# Patient Record
Sex: FEMALE | Race: WHITE | NOT HISPANIC OR LATINO | Employment: OTHER | URBAN - NONMETROPOLITAN AREA
[De-identification: names, ages, dates, MRNs, and addresses within clinical notes are randomized per-mention and may not be internally consistent; named-entity substitution may affect disease eponyms.]

---

## 2017-11-29 ENCOUNTER — AMBULATORY - GICH (OUTPATIENT)
Dept: LAB | Facility: OTHER | Age: 62
End: 2017-11-29

## 2017-11-29 ENCOUNTER — COMMUNICATION - GICH (OUTPATIENT)
Dept: FAMILY MEDICINE | Facility: OTHER | Age: 62
End: 2017-11-29

## 2017-11-29 DIAGNOSIS — Z20.818 CONTACT WITH AND (SUSPECTED) EXPOSURE TO OTHER BACTERIAL COMMUNICABLE DISEASES: ICD-10-CM

## 2017-12-01 LAB — CULTURE - HISTORICAL: NORMAL

## 2017-12-04 ENCOUNTER — COMMUNICATION - GICH (OUTPATIENT)
Dept: FAMILY MEDICINE | Facility: OTHER | Age: 62
End: 2017-12-04

## 2017-12-13 ENCOUNTER — HOSPITAL ENCOUNTER (OUTPATIENT)
Dept: RADIOLOGY | Facility: OTHER | Age: 62
End: 2017-12-13
Attending: FAMILY MEDICINE

## 2017-12-13 ENCOUNTER — AMBULATORY - GICH (OUTPATIENT)
Dept: LAB | Facility: OTHER | Age: 62
End: 2017-12-13

## 2017-12-13 ENCOUNTER — COMMUNICATION - GICH (OUTPATIENT)
Dept: FAMILY MEDICINE | Facility: OTHER | Age: 62
End: 2017-12-13

## 2017-12-13 ENCOUNTER — OFFICE VISIT - GICH (OUTPATIENT)
Dept: FAMILY MEDICINE | Facility: OTHER | Age: 62
End: 2017-12-13

## 2017-12-13 ENCOUNTER — HISTORY (OUTPATIENT)
Dept: FAMILY MEDICINE | Facility: OTHER | Age: 62
End: 2017-12-13

## 2017-12-13 DIAGNOSIS — R79.89 OTHER SPECIFIED ABNORMAL FINDINGS OF BLOOD CHEMISTRY: ICD-10-CM

## 2017-12-13 DIAGNOSIS — E78.5 HYPERLIPIDEMIA: ICD-10-CM

## 2017-12-13 DIAGNOSIS — Z00.00 ENCOUNTER FOR GENERAL ADULT MEDICAL EXAMINATION WITHOUT ABNORMAL FINDINGS: ICD-10-CM

## 2017-12-13 DIAGNOSIS — Z12.31 ENCOUNTER FOR SCREENING MAMMOGRAM FOR MALIGNANT NEOPLASM OF BREAST: ICD-10-CM

## 2017-12-13 DIAGNOSIS — E66.3 OVERWEIGHT: ICD-10-CM

## 2017-12-13 LAB
A/G RATIO - HISTORICAL: 1.7 (ref 1–2)
ALBUMIN SERPL-MCNC: 4.5 G/DL (ref 3.5–5.7)
ALP SERPL-CCNC: 124 IU/L (ref 34–104)
ALT (SGPT) - HISTORICAL: 64 IU/L (ref 7–52)
ANION GAP - HISTORICAL: 10 (ref 5–18)
AST SERPL-CCNC: 36 IU/L (ref 13–39)
BILIRUB SERPL-MCNC: 0.7 MG/DL (ref 0.3–1)
BUN SERPL-MCNC: 18 MG/DL (ref 7–25)
BUN/CREAT RATIO - HISTORICAL: 24
CALCIUM SERPL-MCNC: 9.1 MG/DL (ref 8.6–10.3)
CHLORIDE SERPLBLD-SCNC: 103 MMOL/L (ref 98–107)
CHOL/HDL RATIO - HISTORICAL: 5.07
CHOLESTEROL TOTAL: 218 MG/DL
CO2 SERPL-SCNC: 27 MMOL/L (ref 21–31)
CREAT SERPL-MCNC: 0.75 MG/DL (ref 0.7–1.3)
GFR IF NOT AFRICAN AMERICAN - HISTORICAL: >60 ML/MIN/1.73M2
GLOBULIN - HISTORICAL: 2.6 G/DL (ref 2–3.7)
GLUCOSE SERPL-MCNC: 101 MG/DL (ref 70–105)
HDLC SERPL-MCNC: 43 MG/DL (ref 23–92)
LDLC SERPL CALC-MCNC: 123 MG/DL
NON-HDL CHOLESTEROL - HISTORICAL: 175 MG/DL
POTASSIUM SERPL-SCNC: 4.2 MMOL/L (ref 3.5–5.1)
PROT SERPL-MCNC: 7.1 G/DL (ref 6.4–8.9)
PROVIDER ORDERDED STATUS - HISTORICAL: ABNORMAL
SODIUM SERPL-SCNC: 140 MMOL/L (ref 133–143)
TRIGL SERPL-MCNC: 261 MG/DL

## 2017-12-28 NOTE — TELEPHONE ENCOUNTER
Patient Information     Patient Name MRN Sex Debra Santiago [id number] Female 1955      Telephone Encounter by Sean Schaefer MD at 2017  9:29 AM     Author:  Sean Schaefer MD Service:  (none) Author Type:  Physician     Filed:  2017  9:29 AM Encounter Date:  2017 Status:  Signed     :  Sean Schaefer MD (Physician)            Nasal culture ordered.

## 2017-12-28 NOTE — TELEPHONE ENCOUNTER
Patient Information     Patient Name MRN Debra Parker [id number] Female 1955      Telephone Encounter by Tania Earl at 2017  9:17 AM     Author:  Tania Earl Service:  (none) Author Type:  (none)     Filed:  2017  9:21 AM Encounter Date:  2017 Status:  Signed     :  Tania Earl            Patient's  has MRSA, patient needs to be tested for MRSA because her mother was living with her and is having open heart surgery on Dec 8th. 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  Tania Earl LPN .......................2017  9:20 AM

## 2017-12-28 NOTE — TELEPHONE ENCOUNTER
Patient Information     Patient Name MRN Sex Debra Santiago [id number] Female 1955      Telephone Encounter by Ashlee Geiger at 2017  9:43 AM     Author:  Ashlee Geiger Service:  (none) Author Type:  (none)     Filed:  2017  9:45 AM Encounter Date:  2017 Status:  Signed     :  Ashlee Geiger            Patient notified that order has been placed. She was transferred to the appointment line to get in with diagnostic lab.  Ashlee Geiger LPN  2017  9:44 AM

## 2017-12-28 NOTE — ADDENDUM NOTE
Patient Information     Patient Name MRN Sex Debra Santiago [id number] Female 1955      Addendum Note by Mary Brice at 2017  2:22 PM     Author:  Mary Brice Service:  (none) Author Type:  (none)     Filed:  2017  2:22 PM Encounter Date:  2017 Status:  Signed     :  Mary Brice       Addended by: MARY BRICE on: 2017 02:22 PM        Modules accepted: Orders

## 2018-02-01 ENCOUNTER — DOCUMENTATION ONLY (OUTPATIENT)
Dept: FAMILY MEDICINE | Facility: OTHER | Age: 63
End: 2018-02-01

## 2018-02-01 PROBLEM — N20.0 RENAL CALCULUS: Status: ACTIVE | Noted: 2018-02-01

## 2018-02-01 PROBLEM — M51.26 DISPLACEMENT OF LUMBAR INTERVERTEBRAL DISC WITHOUT MYELOPATHY: Status: ACTIVE | Noted: 2018-02-01

## 2018-02-09 VITALS
DIASTOLIC BLOOD PRESSURE: 74 MMHG | HEIGHT: 64 IN | BODY MASS INDEX: 33.63 KG/M2 | WEIGHT: 197 LBS | SYSTOLIC BLOOD PRESSURE: 126 MMHG | HEART RATE: 84 BPM

## 2018-02-12 NOTE — TELEPHONE ENCOUNTER
Patient Information     Patient Name MRN Sex Debra Santiago [id number] Female 1955      Telephone Encounter by Ashlee Geiger at 2017  3:58 PM     Author:  Ashlee Geiger Service:  (none) Author Type:  (none)     Filed:  2017  3:59 PM Encounter Date:  2017 Status:  Signed     :  Ashlee Geiger            Patient notified of normal results.  Ashlee Geiger LPN  2017  3:58 PM

## 2018-02-12 NOTE — TELEPHONE ENCOUNTER
Patient Information     Patient Name MRN Debra Parker [id number] Female 1955      Telephone Encounter by Dixie Jefferson at 2017  3:49 PM     Author:  Dixie Jefferson Service:  (none) Author Type:  (none)     Filed:  2017  3:55 PM Encounter Date:  2017 Status:  Signed     :  Dixie Jefferson            Patient had [facility] screening and testing for staff infection.  She would like a call with the results.    Dixie Jefferson ....................  2017   3:54 PM

## 2018-02-12 NOTE — PROGRESS NOTES
Patient Information     Patient Name MRN Sex Debra Santiago [id number] Female 1955      Progress Notes by Sean Schaefer MD at 2017  3:00 PM     Author:  Sean Schaefer MD Service:  (none) Author Type:  Physician     Filed:  2017  8:32 AM Encounter Date:  2017 Status:  Signed     :  Sean Schaefer MD (Physician)            SUBJECTIVE:    Debra Heart is a 62 y.o. female who presents for her annual physical exam.    HPI: Patient continues to feel well overall. Her  has been suffering from health issues recently and she has not been able to focus on her own health much. She would like to improve her diet and try to lose weight over the next year. She has a history of hypertriglyceridemia. She is up-to-date on colonoscopy and had her mammogram done earlier today.    PROBLEM LIST:  Patient Active Problem List     Diagnosis  Code     HERNIATED LUMBAR DISC M51.26     RENAL CALCULUS N20.0     Hyperlipemia E78.5     Overweight E66.3     PAST MEDICAL HISTORY:  Past Medical History:     Diagnosis  Date     Bone spur     Right great toe bone spur.        Kidney stones, calcium oxalate     Kidney stones (calcium).       Lumbar herniated disc     Herniated disc (lumbar) x's two.      Menstrual migraine     Menstrual migraine       Poison ivy     Recurring poison ivy      SURGICAL HISTORY:  Past Surgical History:      Procedure  Laterality Date     1st MTP fusion, 2nd hammertoe repair Right 10/29/13    [facility]       BLADDER SUSPENSION       BREAST REDUCTION       COLONOSCOPY SCREENING      Due 2020       KNEE ARTHROSCOPY      Bilateral arthroscopy with lateral releases in  and .        Surgery on Toe      Toe surgery x 2       TONSILLECTOMY  24     TUBAL LIGATION  25       SOCIAL HISTORY:  Social History     Social History        Marital status:       Spouse name: N/A     Number of children:  N/A     Years of education:  N/A      Occupational History      Not on file.     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       retired in .                     FAMILY HISTORY:  Family History       Problem   Relation Age of Onset     Hypertension  Father      Other  Father      Kidney failure       Other  Sister      Acute intermittent porphyria, kidney transplant       Hypertension  Mother      Heart Disease  Mother       replaced heart valve       Arthritis  Mother      Rheumatoid arthritis       Other  Sister      IBS       Good Health  Brother      Other  Brother      Hepatitis C       Good Health  Sister      Heart Disease  Maternal Grandfather       of diabetes       Cancer-breast  Paternal Grandmother       CURRENT MEDICATIONS:   No current outpatient prescriptions on file.     No current facility-administered medications for this visit.      Medications have been reviewed by me and are current to the best of my knowledge and ability.    ALLERGIES:  Tramadol     REVIEW OF SYSTEMS:  General: denies any general problems.  Eyes: denies problems  Ears/Nose/Throat: denies problems  Cardiovascular: denies problems  Respiratory: denies problems  Gastrointestinal: denies problems  Genitourinary: denies problems  Musculoskeletal: denies problems  Skin: denies problems  Neurologic: denies problems  Psychiatric: denies problems  Endocrine: denies problems  Heme/Lymphatic: denies problems  Allergic/Immunologic: denies problems  PHQ Depression Screening 2017   Date of PHQ exam (doc flow) 2017   1. Lack of interest/pleasure 0 - Not at all   2. Feeling down/depressed 0 - Not at all  "  PHQ-2 TOTAL SCORE 0   3. Trouble sleeping -   4. Decreased energy -   5. Appetite change -   6. Feelings of failure -   7. Trouble concentrating -   8. Activity level -   9. Hurting yourself -   PHQ-9 TOTAL SCORE -   PHQ-9 Severity Level -   Functional Impairment -   Some recent data might be hidden       OBJECTIVE:  /74 (Cuff Site: Right Arm, Position: Sitting, Cuff Size: Adult Large)  Pulse 84  Ht 1.626 m (5' 4\")  Wt 89.4 kg (197 lb)  LMP 08/09/2013  BMI 33.81 kg/m2  EXAM:   General Appearance: Pleasant, alert, appropriate appearance for age. No acute distress  Head Exam: Normal. Normocephalic, atraumatic.  Eye Exam:  Normal external eye, conjunctiva, lids, cornea. MIKHAIL.  Ear Exam: Normal TM's bilaterally. Normal auditory canals and external ears. Non-tender.  Nose Exam: Normal external nose, mucus membranes, and septum.  OroPharynx Exam:  Dental hygiene adequate. Normal buccal mucose. Normal pharynx.  Neck Exam:  Supple, no masses or nodes.  Thyroid Exam: No nodules or enlargement.  Chest/Respiratory Exam: Normal chest wall and respirations. Clear to auscultation.  Breast Exam: Mild fibrocystic changes noted bilaterally without dominant masses or axillary adenopathy.  Cardiovascular Exam: Regular rate and rhythm. S1, S2, no murmur, click, gallop, or rubs.  Gastrointestinal Exam: Soft, non-tender, no masses or organomegaly.  Lymphatic Exam: Non-palpable nodes in neck, clavicular, axillary, or inguinal regions.  Musculoskeletal Exam: Back is straight and non-tender, full ROM of upper and lower extremities.  Foot Exam: Left and right foot: good pedal pulses, no lesions, nail hygiene good.  Skin: no rash or abnormalities  Neurologic Exam: Nonfocal, symmetric DTRs, normal gross motor, tone coordination and no tremor.  Psychiatric Exam: Alert and oriented - appropriate affect.    Results for orders placed or performed in visit on 12/13/17      LIPID PANEL      Result  Value Ref Range    CHOLESTEROL,TOTAL " 218 (H) <200 mg/dL    TRIGLYCERIDES 261 (H) <150 mg/dL    HDL CHOLESTEROL 43 23 - 92 mg/dL    NON-HDL CHOLESTEROL 175 (H) <145 mg/dl    CHOL/HDL RATIO            5.07 (H) <4.50                    LDL CHOLESTEROL 123 (H) <100 mg/dL    PROVIDER ORDERED STATUS FASTING    COMPLETE METABOLIC PANEL      Result  Value Ref Range    SODIUM 140 133 - 143 mmol/L    POTASSIUM 4.2 3.5 - 5.1 mmol/L    CHLORIDE 103 98 - 107 mmol/L    CO2,TOTAL 27 21 - 31 mmol/L    ANION GAP 10 5 - 18                    GLUCOSE 101 70 - 105 mg/dL    CALCIUM 9.1 8.6 - 10.3 mg/dL    BUN 18 7 - 25 mg/dL    CREATININE 0.75 0.70 - 1.30 mg/dL    BUN/CREAT RATIO           24                    GFR if African American >60 >60 ml/min/1.73m2    GFR if not African American >60 >60 ml/min/1.73m2    ALBUMIN 4.5 3.5 - 5.7 g/dL    PROTEIN,TOTAL 7.1 6.4 - 8.9 g/dL    GLOBULIN                  2.6 2.0 - 3.7 g/dL    A/G RATIO 1.7 1.0 - 2.0                    BILIRUBIN,TOTAL 0.7 0.3 - 1.0 mg/dL    ALK PHOSPHATASE 124 (H) 34 - 104 IU/L    ALT (SGPT) 64 (H) 7 - 52 IU/L    AST (SGOT) 36 13 - 39 IU/L       ASSESSMENT/PLAN    ICD-10-CM    1. Health care maintenance  Colonoscopy and mammography are up-to-date. Immunizations reviewed. She declines Zostavax and influenza vaccines.  Z00.00    2. Overweight  Patient plans to focus on improving her diet and exercising more regularly over the next year. Reviewed dietary recommendations with her again.  E66.3    3. Hyperlipidemia, unspecified hyperlipidemia type  Triglycerides would likely improve with weight loss and dietary changes.  E78.5    4. Elevated LFTs  This is a new finding. Likely related to NAFLD. Would recommend repeating liver function tests in the next 3 months. Consider further workup if elevations persist.  R79.89        Sean Schaefer MD

## 2018-02-12 NOTE — NURSING NOTE
Patient Information     Patient Name MRN Sex Debra Santiago [id number] Female 1955      Nursing Note by Ashlee Geiger at 2017  3:00 PM     Author:  Ashlee Geiger Service:  (none) Author Type:  (none)     Filed:  2017  3:21 PM Encounter Date:  2017 Status:  Signed     :  Ashlee Geiger            Patient presents today for her annual exam.  Ashlee Geiger LPN  2017  3:04 PM

## 2018-02-12 NOTE — TELEPHONE ENCOUNTER
Patient Information     Patient Name MRN Sex Debra Santiago [id number] Female 1955      Telephone Encounter by Sena Schaefer MD at 2017  8:05 AM     Author:  Sean Schaefer MD Service:  (none) Author Type:  Physician     Filed:  2017  8:05 AM Encounter Date:  2017 Status:  Signed     :  Sean Schaefer MD (Physician)            Orders placed.

## 2018-02-12 NOTE — TELEPHONE ENCOUNTER
Patient Information     Patient Name MRN Sex Debra Santiago [id number] Female 1955      Telephone Encounter by Ashlee Geiger at 2017  7:59 AM     Author:  Ashlee Geiger Service:  (none) Author Type:  (none)     Filed:  2017  8:00 AM Encounter Date:  2017 Status:  Signed     :  Ashlee Geiger            Patient has an afternoon appointment today but would like her labs drawn this morning that way she does not need to fast all day. Please place orders.  Ashlee Geiger LPN  2017  8:00 AM

## 2018-02-28 ENCOUNTER — OFFICE VISIT (OUTPATIENT)
Dept: INTERNAL MEDICINE | Facility: OTHER | Age: 63
End: 2018-02-28
Attending: INTERNAL MEDICINE

## 2018-02-28 VITALS
BODY MASS INDEX: 32.34 KG/M2 | TEMPERATURE: 98 F | DIASTOLIC BLOOD PRESSURE: 76 MMHG | WEIGHT: 188.4 LBS | HEART RATE: 84 BPM | SYSTOLIC BLOOD PRESSURE: 118 MMHG

## 2018-02-28 DIAGNOSIS — R74.01 NONSPECIFIC ELEVATION OF LEVELS OF TRANSAMINASE OR LACTIC ACID DEHYDROGENASE (LDH): ICD-10-CM

## 2018-02-28 DIAGNOSIS — R74.02 NONSPECIFIC ELEVATION OF LEVELS OF TRANSAMINASE OR LACTIC ACID DEHYDROGENASE (LDH): ICD-10-CM

## 2018-02-28 DIAGNOSIS — R05.9 COUGH: Primary | ICD-10-CM

## 2018-02-28 LAB
ALBUMIN SERPL-MCNC: 4.9 G/DL (ref 3.5–5.7)
ALP SERPL-CCNC: 121 U/L (ref 34–104)
ALT SERPL W P-5'-P-CCNC: 43 U/L (ref 7–52)
AST SERPL W P-5'-P-CCNC: 37 U/L (ref 13–39)
BILIRUB DIRECT SERPL-MCNC: 0.1 MG/DL (ref 0–0.2)
BILIRUB SERPL-MCNC: 0.6 MG/DL (ref 0.3–1)
PROT SERPL-MCNC: 7 G/DL (ref 6.4–8.9)

## 2018-02-28 PROCEDURE — 99213 OFFICE O/P EST LOW 20 MIN: CPT | Performed by: INTERNAL MEDICINE

## 2018-02-28 PROCEDURE — 80076 HEPATIC FUNCTION PANEL: CPT | Performed by: INTERNAL MEDICINE

## 2018-02-28 PROCEDURE — 36415 COLL VENOUS BLD VENIPUNCTURE: CPT | Performed by: INTERNAL MEDICINE

## 2018-02-28 RX ORDER — AZITHROMYCIN 250 MG/1
TABLET, FILM COATED ORAL
Qty: 6 TABLET | Refills: 0 | Status: SHIPPED | OUTPATIENT
Start: 2018-02-28 | End: 2018-03-07

## 2018-02-28 RX ORDER — CODEINE PHOSPHATE AND GUAIFENESIN 10; 100 MG/5ML; MG/5ML
1-2 SOLUTION ORAL EVERY 4 HOURS PRN
Qty: 240 ML | Refills: 0 | Status: SHIPPED | OUTPATIENT
Start: 2018-02-28 | End: 2018-03-07

## 2018-02-28 ASSESSMENT — ENCOUNTER SYMPTOMS
ENDOCRINE NEGATIVE: 1
ALLERGIC/IMMUNOLOGIC NEGATIVE: 1
CONSTITUTIONAL NEGATIVE: 1
EYES NEGATIVE: 1

## 2018-02-28 NOTE — PROGRESS NOTES
Chief Complaint:  Cough.    HPI: She is in today with a cough.  This has been for the last 3 days at least.  She has had sweats with this.  She is coughing up green phlegm.  No chest pain.  No shortness of breath.  No exposures.  She feels awful with this and would like to get some sort of treatment.    She also has a history of mild transaminitis.  She needs a follow-up liver test done at this point in time.  I told her we could certainly do that today.    Past Medical History:   Diagnosis Date     Allergic contact dermatitis due to plants, except food     Recurring poison ivy     Calculus of kidney     Kidney stones (calcium).     Enthesopathy     Right great toe bone spur.     Menstrual migraine without status migrainosus, not intractable     Menstrual migraine     Other intervertebral disc displacement, lumbar region     1999,Herniated disc (lumbar) x's two.       Past Surgical History:   Procedure Laterality Date     ARTHROSCOPY KNEE      Bilateral arthroscopy with lateral releases in 1996 and 1997.     COLONOSCOPY      2010,Due 2020     LAPAROSCOPIC TUBAL LIGATION      25     MAMMOPLASTY REDUCTION      1993     OTHER SURGICAL HISTORY      539366,OTHER,Toe surgery x 2     OTHER SURGICAL HISTORY      2010,,BLADDER SUSPENSION     OTHER SURGICAL HISTORY      [facility]788[facility]     TONSILLECTOMY      24       Allergies   Allergen Reactions     Tramadol Rash       Current Outpatient Prescriptions   Medication Sig Dispense Refill     azithromycin (ZITHROMAX) 250 MG tablet Two tablets first day, then one tablet daily for four days. 6 tablet 0     guaiFENesin-codeine (ROBITUSSIN AC) 100-10 MG/5ML SOLN solution Take 5-10 mLs by mouth every 4 hours as needed for cough 240 mL 0       Review of Systems   Constitutional: Negative.    Eyes: Negative.    Endocrine: Negative.    Allergic/Immunologic: Negative.        Physical Exam   Constitutional: She appears well-developed and well-nourished. No  distress.   HENT:   Head: Normocephalic.   Right Ear: External ear normal.   Left Ear: External ear normal.   Mouth/Throat: Oropharynx is clear and moist. No oropharyngeal exudate.   Neck: Normal range of motion. Neck supple. No JVD present. No tracheal deviation present. No thyromegaly present.   Cardiovascular: Normal rate and regular rhythm.    Murmur heard.   Systolic murmur is present with a grade of 2/6   RUSB   Pulmonary/Chest: Effort normal and breath sounds normal. No respiratory distress. She has no wheezes. She has no rales.   Skin: Skin is warm and dry. She is not diaphoretic.   Nursing note and vitals reviewed.      Assessment:        ICD-10-CM    1. Cough R05 azithromycin (ZITHROMAX) 250 MG tablet     guaiFENesin-codeine (ROBITUSSIN AC) 100-10 MG/5ML SOLN solution   2. Nonspecific elevation of levels of transaminase or lactic acid dehydrogenase (LDH) R74.0 Hepatic panel (Albumin, ALT, AST, Bili, Alk Phos, TP)       Plan: Zithromax as directed.  Cough syrup as needed.  Liver panel pending, I will send her a letter with the results and any recommendations.

## 2018-02-28 NOTE — MR AVS SNAPSHOT
"              After Visit Summary   2018    Debra Heart    MRN: [id number]           Patient Information     Date Of Birth          1955        Visit Information        Provider Department      2018 1:00 PM Louie Adair MD [facility]        Today's Diagnoses     Cough    -  1    Nonspecific elevation of levels of transaminase or lactic acid dehydrogenase (LDH)           Follow-ups after your visit        Who to contact     If you have questions or need follow up information about today's clinic visit or your schedule please contact [facility] directly at [phone].  Normal or non-critical lab and imaging results will be communicated to you by [employer], letter or phone within 4 business days after the clinic has received the results. If you do not hear from us within 7 days, please contact the clinic through [employer] or phone. If you have a critical or abnormal lab result, we will notify you by phone as soon as possible.  Submit refill requests through [employer] or call your pharmacy and they will forward the refill request to us. Please allow 3 business days for your refill to be completed.          Additional Information About Your Visit        MyChart Information     [employer] lets you send messages to your doctor, view your test results, renew your prescriptions, schedule appointments and more. To sign up, go to [email or web address] . Click on \"Log in\" on the left side of the screen, which will take you to the Welcome page. Then click on \"Sign up Now\" on the right side of the page.     You will be asked to enter the access code listed below, as well as some personal information. Please follow the directions to create your username and password.     Your access code is: [email or web address]  Expires: 2018  1:36 PM     Your access code will  in 90 days. If you need help or a new code, please call your [place] clinic or [phone].        Care " EveryWhere ID     This is your Care EveryWhere ID. This could be used by other organizations to access your [facility] medical records  [id number]        Your Vitals Were     Pulse Temperature BMI (Body Mass Index)             84 98  F (36.7  C) (Oral) 32.34 kg/m2          Blood Pressure from Last 3 Encounters:   02/28/18 118/76   12/13/17 126/74   10/03/16 124/80    Weight from Last 3 Encounters:   02/28/18 188 lb 6.4 oz (85.5 kg)   12/13/17 197 lb (89.4 kg)   10/03/16 192 lb 6 oz (87.3 kg)              We Performed the Following     Hepatic panel (Albumin, ALT, AST, Bili, Alk Phos, TP)          Today's Medication Changes          These changes are accurate as of 2/28/18  1:36 PM.  If you have any questions, ask your nurse or doctor.               Start taking these medicines.        Dose/Directions    azithromycin 250 MG tablet   Commonly known as:  ZITHROMAX   Used for:  Cough   Started by:  Louie Adair MD        Two tablets first day, then one tablet daily for four days.   Quantity:  6 tablet   Refills:  0       guaiFENesin-codeine 100-10 MG/5ML Soln solution   Commonly known as:  ROBITUSSIN AC   Used for:  Cough   Started by:  Louie Adair MD        Dose:  1-2 tsp.   Take 5-10 mLs by mouth every 4 hours as needed for cough   Quantity:  240 mL   Refills:  0            Where to get your medicines      These medications were sent to [facility] [address] [place] [id number]     Phone:  [phone]     azithromycin 250 MG tablet         Some of these will need a paper prescription and others can be bought over the counter.  Ask your nurse if you have questions.     Bring a paper prescription for each of these medications     guaiFENesin-codeine 100-10 MG/5ML Soln solution                Primary Care Provider    None Specified       No primary provider on file.        Equal Access to Services     ALEXANDER SCHULER AH: Brooks berrios  Nilo, divyada lujaspreetadaha, alessandrota kajordan borrero, joselito brown lovelyharpreet cespedesbillie dru. So [facility] [phone].    ATENCIÓN: Si franklin cook, tiene a justin disposición servicios gratuitos de asistencia lingüística. Navin al [phone].    We comply with applicable federal civil rights laws and [place] laws. We do not discriminate on the basis of race, color, national origin, age, disability, sex, sexual orientation, or gender identity.            Thank you!     Thank you for choosing [facility] AND \[facility] [place] [facility]\""  for your care. Our goal is always to provide you with excellent care. Hearing back from our patients is one way we can continue to improve our services. Please take a few minutes to complete the written survey that you may receive in the mail after your visit with us. Thank you!             Your Updated Medication List - Protect others around you: Learn how to safely use, store and throw away your medicines at www.disposemymeds.org.          This list is accurate as of 2/28/18  1:36 PM.  Always use your most recent med list.                   Brand Name Dispense Instructions for use Diagnosis    azithromycin 250 MG tablet    ZITHROMAX    6 tablet    Two tablets first day, then one tablet daily for four days.    Cough       guaiFENesin-codeine 100-10 MG/5ML Soln solution    ROBITUSSIN AC    240 mL    Take 5-10 mLs by mouth every 4 hours as needed for cough    Cough

## 2018-02-28 NOTE — LETTER
February 28, 2018      Debra Heart  [address] [address] [address]        Dear Debra,    Below are the results of your recent labs:    Results for orders placed or performed in visit on 02/28/18   Hepatic panel (Albumin, ALT, AST, Bili, Alk Phos, TP)   Result Value Ref Range    Bilirubin Direct 0.1 0.0 - 0.2 mg/dL    Bilirubin Total 0.6 0.3 - 1.0 mg/dL    Albumin 4.9 3.5 - 5.7 g/dL    Protein Total 7.0 6.4 - 8.9 g/dL    Alkaline Phosphatase 121 (H) 34 - 104 U/L    ALT 43 7 - 52 U/L    AST 37 13 - 39 U/L        Your alkaline phosphatase is minimally elevated, the rest of your liver tests are normal.  The alkaline phosphatase can come from bone or liver so I do not know exactly why it is elevated but it does not appear to be concerning at this time.  I would suggest you just have this checked again in about 6 months.  If you have questions or concerns, feel free to contact me.    Sincerely,        Louie Adair MD  Internal Medicine  [facility]

## 2018-02-28 NOTE — NURSING NOTE
The patient is here today to be seen for a productive cough with green and yellow phlegm.  PRASANNA FERNANDEZ LPN 2/28/2018 1:11 PM

## 2018-03-01 ENCOUNTER — TELEPHONE (OUTPATIENT)
Dept: INTERNAL MEDICINE | Facility: OTHER | Age: 63
End: 2018-03-01

## 2018-03-01 ASSESSMENT — PATIENT HEALTH QUESTIONNAIRE - PHQ9: SUM OF ALL RESPONSES TO PHQ QUESTIONS 1-9: 0

## 2018-03-01 NOTE — TELEPHONE ENCOUNTER
Contacted [facility] pharmacy and gave them a verbal order for the cough medicine from yesterday.  PRASANNA FERNANDEZ LPN 3/1/2018 9:37 AM

## 2018-03-05 ENCOUNTER — DOCUMENTATION ONLY (OUTPATIENT)
Dept: OTHER | Facility: CLINIC | Age: 63
End: 2018-03-05

## 2018-03-05 PROBLEM — Z71.89 ACP (ADVANCE CARE PLANNING): Chronic | Status: ACTIVE | Noted: 2018-03-05

## 2018-03-06 DIAGNOSIS — R05.9 COUGH: ICD-10-CM

## 2018-03-06 RX ORDER — AZITHROMYCIN 250 MG/1
TABLET, FILM COATED ORAL
Qty: 6 TABLET | Refills: 0 | OUTPATIENT
Start: 2018-03-06

## 2018-03-06 NOTE — TELEPHONE ENCOUNTER
Pharmacy is requesting a refill of Azithromycin which was ordered for a 5 day course for a cough. Now finished, 6 tablets ordered with patient to take 2 tabs on first day and 1 tablet for 4 days after. Not appropriate for refill. Teresa Garza RN on 3/6/2018 at 12:04 PM

## 2018-03-07 ENCOUNTER — TELEPHONE (OUTPATIENT)
Dept: INTERNAL MEDICINE | Facility: OTHER | Age: 63
End: 2018-03-07

## 2018-03-07 DIAGNOSIS — R05.9 COUGH: ICD-10-CM

## 2018-03-07 RX ORDER — CODEINE PHOSPHATE AND GUAIFENESIN 10; 100 MG/5ML; MG/5ML
1-2 SOLUTION ORAL EVERY 4 HOURS PRN
Qty: 240 ML | Refills: 0 | Status: SHIPPED | OUTPATIENT
Start: 2018-03-07 | End: 2018-08-29

## 2018-03-07 NOTE — TELEPHONE ENCOUNTER
Chart review shows that patient was seen by Dr. Adair on 2/28/18. Was diagnosed with a cough and was treated with a Z-Pack and was given robitussin AC. As per reason for call, patient with recurring symptoms and would like additional medication. Writer will route rx request to Dr. Adair for his consideration/input of a new Rx.     Luis Enrique Botello RN on 3/7/2018 at 12:40 PM

## 2018-03-07 NOTE — TELEPHONE ENCOUNTER
Contacted the patient and gave her the information below. She requested a refill on the cough medicine if anything please.  PRASANNA FERNANDEZ LPN 3/7/2018 1:04 PM

## 2018-03-07 NOTE — TELEPHONE ENCOUNTER
Another antibiotic is probably not appropriate unless she is having a fever or worsened symptoms.  I can certainly refill the cough syrup if she would like.  If she does have new symptoms that are of concern, please advise.  Otherwise this is likely viral, thus the reason the first antibiotic was not successful.

## 2018-08-29 ENCOUNTER — OFFICE VISIT (OUTPATIENT)
Dept: FAMILY MEDICINE | Facility: OTHER | Age: 63
End: 2018-08-29
Attending: NURSE PRACTITIONER
Payer: COMMERCIAL

## 2018-08-29 VITALS
TEMPERATURE: 97.3 F | BODY MASS INDEX: 33.28 KG/M2 | WEIGHT: 194.9 LBS | DIASTOLIC BLOOD PRESSURE: 80 MMHG | SYSTOLIC BLOOD PRESSURE: 130 MMHG | HEIGHT: 64 IN | HEART RATE: 78 BPM

## 2018-08-29 DIAGNOSIS — H10.32 ACUTE BACTERIAL CONJUNCTIVITIS OF LEFT EYE: Primary | ICD-10-CM

## 2018-08-29 DIAGNOSIS — J06.9 VIRAL URI WITH COUGH: ICD-10-CM

## 2018-08-29 DIAGNOSIS — H57.89 EYE DRAINAGE: ICD-10-CM

## 2018-08-29 PROCEDURE — 99213 OFFICE O/P EST LOW 20 MIN: CPT | Performed by: NURSE PRACTITIONER

## 2018-08-29 RX ORDER — CODEINE PHOSPHATE AND GUAIFENESIN 10; 100 MG/5ML; MG/5ML
1-2 SOLUTION ORAL EVERY 6 HOURS PRN
Qty: 240 ML | Refills: 0 | Status: SHIPPED | OUTPATIENT
Start: 2018-08-29 | End: 2018-10-11

## 2018-08-29 RX ORDER — OFLOXACIN 3 MG/ML
2 SOLUTION/ DROPS OPHTHALMIC 4 TIMES DAILY
Qty: 1 BOTTLE | Refills: 0 | Status: SHIPPED | OUTPATIENT
Start: 2018-08-29 | End: 2019-02-04

## 2018-08-29 RX ORDER — BENZONATATE 100 MG/1
100 CAPSULE ORAL 3 TIMES DAILY PRN
Qty: 42 CAPSULE | Refills: 1 | Status: SHIPPED | OUTPATIENT
Start: 2018-08-29 | End: 2018-10-11

## 2018-08-29 ASSESSMENT — PAIN SCALES - GENERAL: PAINLEVEL: MILD PAIN (3)

## 2018-08-29 NOTE — PATIENT INSTRUCTIONS
Ofloxacin eye drops - 2 drops 4 x day for 5 to 7 days     Moist compresses as needed    Follow up if worsening or concerns      Robitussin with codeine for cough    Tessalon for cough    Follow up if symptoms persisting without improvement, worsening or concerns

## 2018-08-29 NOTE — MR AVS SNAPSHOT
"              After Visit Summary   8/29/2018    Debra Heart    MRN: [id number]           Patient Information     Date Of Birth          1955        Visit Information        Provider Department      8/29/2018 12:30 PM Idalia Nieves NP [facility]        Today's Diagnoses     Acute bacterial conjunctivitis of left eye    -  1    Eye drainage        Viral URI with cough          Care Instructions    Ofloxacin eye drops - 2 drops 4 x day for 5 to 7 days     Moist compresses as needed    Follow up if worsening or concerns      Robitussin with codeine for cough    Tessalon for cough    Follow up if symptoms persisting without improvement, worsening or concerns          Follow-ups after your visit        Who to contact     If you have questions or need follow up information about today's clinic visit or your schedule please contact [facility] AND [facility] directly at [phone].  Normal or non-critical lab and imaging results will be communicated to you by MyChart, letter or phone within 4 business days after the clinic has received the results. If you do not hear from us within 7 days, please contact the clinic through MyChart or phone. If you have a critical or abnormal lab result, we will notify you by phone as soon as possible.  Submit refill requests through [employer] or call your pharmacy and they will forward the refill request to us. Please allow 3 business days for your refill to be completed.          Additional Information About Your Visit        Care EveryWhere ID     This is your Care EveryWhere ID. This could be used by other organizations to access your [place] medical records  [id number]        Your Vitals Were     Pulse Temperature Height Breastfeeding? BMI (Body Mass Index)       78 97.3  F (36.3  C) (Tympanic) 5' 3.78\" (1.62 m) No 33.69 kg/m2        Blood Pressure from Last 3 Encounters:   08/29/18 130/80   02/28/18 118/76   12/13/17 126/74    Weight from " Last 3 Encounters:   08/29/18 194 lb 14.4 oz (88.4 kg)   02/28/18 188 lb 6.4 oz (85.5 kg)   12/13/17 197 lb (89.4 kg)              Today, you had the following     No orders found for display         Today's Medication Changes          These changes are accurate as of 8/29/18  1:07 PM.  If you have any questions, ask your nurse or doctor.               Start taking these medicines.        Dose/Directions    benzonatate 100 MG capsule   Commonly known as:  TESSALON   Used for:  Viral URI with cough   Started by:  Idalia Nieves NP        Dose:  100 mg   Take 1 capsule (100 mg) by mouth 3 times daily as needed for cough   Quantity:  42 capsule   Refills:  1       ofloxacin 0.3 % ophthalmic solution   Commonly known as:  OCUFLOX   Used for:  Acute bacterial conjunctivitis of left eye   Started by:  Idalia Nieves NP        Dose:  2 drop   Place 2 drops Into the left eye 4 times daily for 7 days   Quantity:  1 Bottle   Refills:  0         These medicines have changed or have updated prescriptions.        Dose/Directions    guaiFENesin-codeine 100-10 MG/5ML Soln solution   Commonly known as:  ROBITUSSIN AC   This may have changed:  when to take this   Used for:  Viral URI with cough   Changed by:  Idalia Nieves NP        Dose:  1-2 tsp.   Take 5-10 mLs by mouth every 6 hours as needed for cough   Quantity:  240 mL   Refills:  0            Where to get your medicines      These medications were sent to [facility] [address] [place] [id number]     Phone:  [phone]     benzonatate 100 MG capsule    ofloxacin 0.3 % ophthalmic solution         Some of these will need a paper prescription and others can be bought over the counter.  Ask your nurse if you have questions.     Bring a paper prescription for each of these medications     guaiFENesin-codeine 100-10 MG/5ML Soln solution                Primary Care Provider Fax #    Physician No  Ref-Primary [phone]       No address on file        Equal Access to Services     ALEXANDER MANDELRANDAL : Hadii camacho padilla agustina Corcoran, wadarrenda kevindillonha, mustapha pritchard alfredluisito, waxradhames mehreenin hayaabillie simontamy [place] la'paulybillie gonsales. So [facility] [phone].    ATENCIÓN: Si habla español, tiene a justin disposición servicios gratuitos de asistencia lingüística. Llame al [phone].    We comply with applicable federal civil rights laws and [place] laws. We do not discriminate on the basis of race, color, national origin, age, disability, sex, sexual orientation, or gender identity.            Thank you!     Thank you for choosing [facility] AND [facility]  for your care. Our goal is always to provide you with excellent care. Hearing back from our patients is one way we can continue to improve our services. Please take a few minutes to complete the written survey that you may receive in the mail after your visit with us. Thank you!             Your Updated Medication List - Protect others around you: Learn how to safely use, store and throw away your medicines at www.disposemymeds.org.          This list is accurate as of 8/29/18  1:07 PM.  Always use your most recent med list.                   Brand Name Dispense Instructions for use Diagnosis    benzonatate 100 MG capsule    TESSALON    42 capsule    Take 1 capsule (100 mg) by mouth 3 times daily as needed for cough    Viral URI with cough       guaiFENesin-codeine 100-10 MG/5ML Soln solution    ROBITUSSIN AC    240 mL    Take 5-10 mLs by mouth every 6 hours as needed for cough    Viral URI with cough       ofloxacin 0.3 % ophthalmic solution    OCUFLOX    1 Bottle    Place 2 drops Into the left eye 4 times daily for 7 days    Acute bacterial conjunctivitis of left eye

## 2018-08-29 NOTE — PROGRESS NOTES
HPI:    Debra Heart is a 63 year old female  who presents to clinic today for eye concern.    Left eye with mattering starting last night.  Left eyelid with burning sensation.  Left eye feels sore and irritated.  Mild blurriness in the left eye.  Left eye with light sensitivity.  States symptoms started after she coughed into her elbow and she felt it go into her left eye.  Crusted shut this morning.  Left eye continues to weep and drain constantly.  States she has had a dry cough for the past 2 days.  Sore throat and swollen glands for the past 2 days.  Nasal and sinus congestion and drainage x 2 days.  Body aches for the past 2 days.  No headaches.  No dizziness or light headedness.  Energy decreased the past few days.  No fevers.  Taking Mucinex.  Requesting some cough medication.          Past Medical History:   Diagnosis Date     Allergic contact dermatitis due to plants, except food     Recurring poison ivy     Calculus of kidney     Kidney stones (calcium).     Enthesopathy     Right great toe bone spur.     Menstrual migraine without status migrainosus, not intractable     Menstrual migraine     Other intervertebral disc displacement, lumbar region     1999,Herniated disc (lumbar) x's two.     Past Surgical History:   Procedure Laterality Date     ARTHROSCOPY KNEE      Bilateral arthroscopy with lateral releases in 1996 and 1997.     COLONOSCOPY      2010,Due 2020     LAPAROSCOPIC TUBAL LIGATION      25     MAMMOPLASTY REDUCTION      1993     OTHER SURGICAL HISTORY      365097,OTHER,Toe surgery x 2     OTHER SURGICAL HISTORY      2010,,BLADDER SUSPENSION     OTHER SURGICAL HISTORY      10/29/13,572869,OTHER,Right,[facility]     TONSILLECTOMY      24     Social History   Substance Use Topics     Smoking status: Never Smoker     Smokeless tobacco: Never Used     Alcohol use 0.5 oz/week      Comment: Alcoholic Drinks/day: occasionally     No current outpatient prescriptions on file.     Allergies  "  Allergen Reactions     Tramadol Rash         Past medical history, past surgical history, current medications and allergies reviewed and accurate to the best of my knowledge.        ROS:  Refer to HPI    /80 (BP Location: Left arm, Patient Position: Sitting, Cuff Size: Adult Regular)  Pulse 78  Temp 97.3  F (36.3  C) (Tympanic)  Ht 5' 3.78\" (1.62 m)  Wt 194 lb 14.4 oz (88.4 kg)  Breastfeeding? No  BMI 33.69 kg/m2    EXAM:  General Appearance: Well appearing adult female, appropriate appearance for age. No acute distress  Head: normocephalic, atraumatic  Ears: Left TM grey, translucent with bony landmarks appreciated, no erythema, no effusion, no bulging, no purulence.  Right TM grey, translucent with bony landmarks appreciated, no erythema, no effusion, no bulging, no purulence.  Left auditory canal clear.  Right auditory canal clear.  Normal external ears, non tender.  Eyes: Bilateral conjunctivae normal without erythema, no subconjunctival hemorrhage, no hypopyon, no hyphema, corneas clear.  Left eyelid with irritation, persistent thick yellow drainage and mild crusting, minimal eyelid swelling.  Right eye without drainage or crusting or eyelid swelling.  PERRLA.  No periorbital tenderness to palpation.  Orophayrnx: moist mucous membranes, posterior pharynx without erythema, tonsils surgically absent, no post nasal drip seen, no trismus.    Sinuses:  No sinus tenderness upon palpation of the frontal or maxillary sinuses  Neck: supple without adenopathy  Respiratory: normal chest wall and respirations.  Normal effort.  Clear to auscultation bilaterally, no wheezing, crackles or rhonchi.  No increased work of breathing.  No cough appreciated.   Cardiac: RRR with no murmurs  Musculoskeletal:  Normal gait.  Equal movement of bilateral upper extremities.  Equal movement of bilateral lower extremities.    Dermatological: no facial rash or erythema   Psychological: normal affect, alert and " pleasant          ASSESSMENT/PLAN:    ICD-10-CM    1. Acute bacterial conjunctivitis of left eye H10.32 ofloxacin (OCUFLOX) 0.3 % ophthalmic solution   2. Eye drainage H57.8    3. Viral URI with cough J06.9 guaiFENesin-codeine (ROBITUSSIN AC) 100-10 MG/5ML SOLN solution    B97.89 benzonatate (TESSALON) 100 MG capsule       EYE:  Ofloxacin eye drops - 1 to 2 drops to left eye QID x 5 to 7 days     Moist compresses    Wash eyelid gently with tearless baby shampoo    Hand hygiene    Avoid rubbing or touching eyes    Discussed warning signs/symptoms indicative of need to f/u    Follow up if symptoms persist or worsen or concerns      URI:  Robitussin with codeine Q 6 hours PRN  Tessalon 100 mg TID PRN     Encouraged fluids  Symptomatic treatment - salt water gargles, honey, elevation, humidifier, sinus rinse/netti pot, lozenges, etc     Tylenol or ibuprofen PRN    Discussed warning signs/symptoms indicative of need to f/u    Follow up if symptoms persist or worsen or concerns

## 2018-08-29 NOTE — NURSING NOTE
"Patient presents with left mattery eye starting last night. Patient has had a cough for a few days. Mandie Allred LPN .............8/29/2018  12:42 PM  Chief Complaint   Patient presents with     Eye Problem       Initial /80 (BP Location: Left arm, Patient Position: Sitting, Cuff Size: Adult Regular)  Pulse 78  Temp 97.3  F (36.3  C) (Tympanic)  Ht 5' 3.78\" (1.62 m)  Wt 194 lb 14.4 oz (88.4 kg)  Breastfeeding? No  BMI 33.69 kg/m2 Estimated body mass index is 33.69 kg/(m^2) as calculated from the following:    Height as of this encounter: 5' 3.78\" (1.62 m).    Weight as of this encounter: 194 lb 14.4 oz (88.4 kg).  Medication Reconciliation: complete    aMndie Allred LPN  "

## 2018-10-11 ENCOUNTER — APPOINTMENT (OUTPATIENT)
Dept: CT IMAGING | Facility: OTHER | Age: 63
End: 2018-10-11
Attending: FAMILY MEDICINE
Payer: COMMERCIAL

## 2018-10-11 ENCOUNTER — OFFICE VISIT (OUTPATIENT)
Dept: FAMILY MEDICINE | Facility: OTHER | Age: 63
End: 2018-10-11
Attending: NURSE PRACTITIONER
Payer: COMMERCIAL

## 2018-10-11 ENCOUNTER — HOSPITAL ENCOUNTER (EMERGENCY)
Facility: OTHER | Age: 63
Discharge: HOME | End: 2018-10-11
Attending: FAMILY MEDICINE | Admitting: FAMILY MEDICINE
Payer: COMMERCIAL

## 2018-10-11 VITALS
DIASTOLIC BLOOD PRESSURE: 80 MMHG | RESPIRATION RATE: 18 BRPM | OXYGEN SATURATION: 95 % | TEMPERATURE: 98.1 F | BODY MASS INDEX: 32.95 KG/M2 | HEART RATE: 78 BPM | HEIGHT: 64 IN | WEIGHT: 193 LBS | SYSTOLIC BLOOD PRESSURE: 132 MMHG

## 2018-10-11 VITALS
DIASTOLIC BLOOD PRESSURE: 90 MMHG | WEIGHT: 193.6 LBS | HEART RATE: 64 BPM | SYSTOLIC BLOOD PRESSURE: 140 MMHG | BODY MASS INDEX: 33.46 KG/M2

## 2018-10-11 DIAGNOSIS — N23 RENAL COLIC: ICD-10-CM

## 2018-10-11 DIAGNOSIS — Z87.442 HISTORY OF KIDNEY STONES: ICD-10-CM

## 2018-10-11 DIAGNOSIS — R10.9 RIGHT FLANK PAIN: Primary | ICD-10-CM

## 2018-10-11 LAB
ALBUMIN SERPL-MCNC: 4.7 G/DL (ref 3.5–5.7)
ALBUMIN SERPL-MCNC: 4.8 G/DL (ref 3.5–5.7)
ALBUMIN UR-MCNC: NEGATIVE MG/DL
ALP SERPL-CCNC: 100 U/L (ref 34–104)
ALP SERPL-CCNC: 105 U/L (ref 34–104)
ALT SERPL W P-5'-P-CCNC: 25 U/L (ref 7–52)
ALT SERPL W P-5'-P-CCNC: 26 U/L (ref 7–52)
ANION GAP SERPL CALCULATED.3IONS-SCNC: 9 MMOL/L (ref 3–14)
ANION GAP SERPL CALCULATED.3IONS-SCNC: 9 MMOL/L (ref 3–14)
APPEARANCE UR: CLEAR
AST SERPL W P-5'-P-CCNC: 20 U/L (ref 13–39)
AST SERPL W P-5'-P-CCNC: 21 U/L (ref 13–39)
BACTERIA #/AREA URNS HPF: ABNORMAL /HPF
BASOPHILS # BLD AUTO: 0 10E9/L (ref 0–0.2)
BASOPHILS # BLD AUTO: 0 10E9/L (ref 0–0.2)
BASOPHILS NFR BLD AUTO: 0.4 %
BASOPHILS NFR BLD AUTO: 0.7 %
BILIRUB SERPL-MCNC: 0.7 MG/DL (ref 0.3–1)
BILIRUB SERPL-MCNC: 0.7 MG/DL (ref 0.3–1)
BILIRUB UR QL STRIP: NEGATIVE
BUN SERPL-MCNC: 13 MG/DL (ref 7–25)
BUN SERPL-MCNC: 14 MG/DL (ref 7–25)
CALCIUM SERPL-MCNC: 9.8 MG/DL (ref 8.6–10.3)
CALCIUM SERPL-MCNC: 9.9 MG/DL (ref 8.6–10.3)
CHLORIDE SERPL-SCNC: 101 MMOL/L (ref 98–107)
CHLORIDE SERPL-SCNC: 102 MMOL/L (ref 98–107)
CO2 SERPL-SCNC: 26 MMOL/L (ref 21–31)
CO2 SERPL-SCNC: 26 MMOL/L (ref 21–31)
COLOR UR AUTO: YELLOW
CREAT SERPL-MCNC: 0.7 MG/DL (ref 0.6–1.2)
CREAT SERPL-MCNC: 0.78 MG/DL (ref 0.6–1.2)
CRP SERPL-MCNC: 0.4 MG/L
DIFFERENTIAL METHOD BLD: NORMAL
DIFFERENTIAL METHOD BLD: NORMAL
EOSINOPHIL # BLD AUTO: 0 10E9/L (ref 0–0.7)
EOSINOPHIL # BLD AUTO: 0.1 10E9/L (ref 0–0.7)
EOSINOPHIL NFR BLD AUTO: 0.3 %
EOSINOPHIL NFR BLD AUTO: 1.3 %
ERYTHROCYTE [DISTWIDTH] IN BLOOD BY AUTOMATED COUNT: 12.4 % (ref 10–15)
ERYTHROCYTE [DISTWIDTH] IN BLOOD BY AUTOMATED COUNT: 12.5 % (ref 10–15)
GFR SERPL CREATININE-BSD FRML MDRD: 75 ML/MIN/1.7M2
GFR SERPL CREATININE-BSD FRML MDRD: 85 ML/MIN/1.7M2
GLUCOSE SERPL-MCNC: 117 MG/DL (ref 70–105)
GLUCOSE SERPL-MCNC: 136 MG/DL (ref 70–105)
GLUCOSE UR STRIP-MCNC: NEGATIVE MG/DL
HCT VFR BLD AUTO: 39 % (ref 35–47)
HCT VFR BLD AUTO: 39.8 % (ref 35–47)
HGB BLD-MCNC: 13.2 G/DL (ref 11.7–15.7)
HGB BLD-MCNC: 13.4 G/DL (ref 11.7–15.7)
HGB UR QL STRIP: ABNORMAL
IMM GRANULOCYTES # BLD: 0 10E9/L (ref 0–0.4)
IMM GRANULOCYTES # BLD: 0 10E9/L (ref 0–0.4)
IMM GRANULOCYTES NFR BLD: 0.3 %
IMM GRANULOCYTES NFR BLD: 0.3 %
KETONES UR STRIP-MCNC: NEGATIVE MG/DL
LEUKOCYTE ESTERASE UR QL STRIP: ABNORMAL
LYMPHOCYTES # BLD AUTO: 0.9 10E9/L (ref 0.8–5.3)
LYMPHOCYTES # BLD AUTO: 1.1 10E9/L (ref 0.8–5.3)
LYMPHOCYTES NFR BLD AUTO: 11.6 %
LYMPHOCYTES NFR BLD AUTO: 18.4 %
MCH RBC QN AUTO: 29.1 PG (ref 26.5–33)
MCH RBC QN AUTO: 29.4 PG (ref 26.5–33)
MCHC RBC AUTO-ENTMCNC: 33.7 G/DL (ref 31.5–36.5)
MCHC RBC AUTO-ENTMCNC: 33.8 G/DL (ref 31.5–36.5)
MCV RBC AUTO: 87 FL (ref 78–100)
MCV RBC AUTO: 87 FL (ref 78–100)
MONOCYTES # BLD AUTO: 0.4 10E9/L (ref 0–1.3)
MONOCYTES # BLD AUTO: 0.4 10E9/L (ref 0–1.3)
MONOCYTES NFR BLD AUTO: 5 %
MONOCYTES NFR BLD AUTO: 6.5 %
NEUTROPHILS # BLD AUTO: 4.3 10E9/L (ref 1.6–8.3)
NEUTROPHILS # BLD AUTO: 6.3 10E9/L (ref 1.6–8.3)
NEUTROPHILS NFR BLD AUTO: 72.8 %
NEUTROPHILS NFR BLD AUTO: 82.4 %
NITRATE UR QL: NEGATIVE
PH UR STRIP: 5 PH (ref 5–9)
PLATELET # BLD AUTO: 231 10E9/L (ref 150–450)
PLATELET # BLD AUTO: 247 10E9/L (ref 150–450)
POTASSIUM SERPL-SCNC: 3.8 MMOL/L (ref 3.5–5.1)
POTASSIUM SERPL-SCNC: 4.1 MMOL/L (ref 3.5–5.1)
PROT SERPL-MCNC: 7.5 G/DL (ref 6.4–8.9)
PROT SERPL-MCNC: 7.6 G/DL (ref 6.4–8.9)
RBC # BLD AUTO: 4.49 10E12/L (ref 3.8–5.2)
RBC # BLD AUTO: 4.6 10E12/L (ref 3.8–5.2)
RBC #/AREA URNS AUTO: ABNORMAL /HPF
SODIUM SERPL-SCNC: 136 MMOL/L (ref 134–144)
SODIUM SERPL-SCNC: 137 MMOL/L (ref 134–144)
SOURCE: ABNORMAL
SP GR UR STRIP: >1.03 (ref 1–1.03)
UROBILINOGEN UR STRIP-ACNC: 0.2 EU/DL (ref 0.2–1)
WBC # BLD AUTO: 6 10E9/L (ref 4–11)
WBC # BLD AUTO: 7.6 10E9/L (ref 4–11)
WBC #/AREA URNS AUTO: ABNORMAL /HPF

## 2018-10-11 PROCEDURE — 85025 COMPLETE CBC W/AUTO DIFF WBC: CPT | Performed by: NURSE PRACTITIONER

## 2018-10-11 PROCEDURE — 80053 COMPREHEN METABOLIC PANEL: CPT | Performed by: NURSE PRACTITIONER

## 2018-10-11 PROCEDURE — 99283 EMERGENCY DEPT VISIT LOW MDM: CPT | Mod: Z6 | Performed by: FAMILY MEDICINE

## 2018-10-11 PROCEDURE — 96374 THER/PROPH/DIAG INJ IV PUSH: CPT | Performed by: FAMILY MEDICINE

## 2018-10-11 PROCEDURE — 86140 C-REACTIVE PROTEIN: CPT | Performed by: FAMILY MEDICINE

## 2018-10-11 PROCEDURE — 99284 EMERGENCY DEPT VISIT MOD MDM: CPT | Mod: 25 | Performed by: FAMILY MEDICINE

## 2018-10-11 PROCEDURE — 80053 COMPREHEN METABOLIC PANEL: CPT | Performed by: FAMILY MEDICINE

## 2018-10-11 PROCEDURE — 87086 URINE CULTURE/COLONY COUNT: CPT | Performed by: FAMILY MEDICINE

## 2018-10-11 PROCEDURE — 36415 COLL VENOUS BLD VENIPUNCTURE: CPT | Performed by: FAMILY MEDICINE

## 2018-10-11 PROCEDURE — 74176 CT ABD & PELVIS W/O CONTRAST: CPT

## 2018-10-11 PROCEDURE — 25000128 H RX IP 250 OP 636: Performed by: FAMILY MEDICINE

## 2018-10-11 PROCEDURE — 85025 COMPLETE CBC W/AUTO DIFF WBC: CPT | Performed by: FAMILY MEDICINE

## 2018-10-11 PROCEDURE — 96361 HYDRATE IV INFUSION ADD-ON: CPT | Performed by: FAMILY MEDICINE

## 2018-10-11 PROCEDURE — 81001 URINALYSIS AUTO W/SCOPE: CPT | Performed by: NURSE PRACTITIONER

## 2018-10-11 PROCEDURE — 99213 OFFICE O/P EST LOW 20 MIN: CPT | Performed by: NURSE PRACTITIONER

## 2018-10-11 PROCEDURE — 96375 TX/PRO/DX INJ NEW DRUG ADDON: CPT | Performed by: FAMILY MEDICINE

## 2018-10-11 RX ORDER — ONDANSETRON 2 MG/ML
4 INJECTION INTRAMUSCULAR; INTRAVENOUS
Status: COMPLETED | OUTPATIENT
Start: 2018-10-11 | End: 2018-10-11

## 2018-10-11 RX ORDER — HYDROMORPHONE HYDROCHLORIDE 1 MG/ML
0.5 INJECTION, SOLUTION INTRAMUSCULAR; INTRAVENOUS; SUBCUTANEOUS
Status: DISCONTINUED | OUTPATIENT
Start: 2018-10-11 | End: 2018-10-11 | Stop reason: HOSPADM

## 2018-10-11 RX ORDER — SODIUM CHLORIDE 9 MG/ML
INJECTION, SOLUTION INTRAVENOUS CONTINUOUS
Status: DISCONTINUED | OUTPATIENT
Start: 2018-10-11 | End: 2018-10-11 | Stop reason: HOSPADM

## 2018-10-11 RX ORDER — KETOROLAC TROMETHAMINE 15 MG/ML
15 INJECTION, SOLUTION INTRAMUSCULAR; INTRAVENOUS ONCE
Status: COMPLETED | OUTPATIENT
Start: 2018-10-11 | End: 2018-10-11

## 2018-10-11 RX ORDER — SODIUM CHLORIDE, SODIUM LACTATE, POTASSIUM CHLORIDE, CALCIUM CHLORIDE 600; 310; 30; 20 MG/100ML; MG/100ML; MG/100ML; MG/100ML
1000 INJECTION, SOLUTION INTRAVENOUS CONTINUOUS
Status: DISCONTINUED | OUTPATIENT
Start: 2018-10-11 | End: 2018-10-11 | Stop reason: HOSPADM

## 2018-10-11 RX ADMIN — ONDANSETRON 4 MG: 2 INJECTION INTRAMUSCULAR; INTRAVENOUS at 13:01

## 2018-10-11 RX ADMIN — KETOROLAC TROMETHAMINE 15 MG: 15 INJECTION, SOLUTION INTRAMUSCULAR; INTRAVENOUS at 13:00

## 2018-10-11 RX ADMIN — SODIUM CHLORIDE, SODIUM LACTATE, POTASSIUM CHLORIDE, AND CALCIUM CHLORIDE 1000 ML: 600; 310; 30; 20 INJECTION, SOLUTION INTRAVENOUS at 13:00

## 2018-10-11 ASSESSMENT — ANXIETY QUESTIONNAIRES
3. WORRYING TOO MUCH ABOUT DIFFERENT THINGS: NOT AT ALL
5. BEING SO RESTLESS THAT IT IS HARD TO SIT STILL: NOT AT ALL
IF YOU CHECKED OFF ANY PROBLEMS ON THIS QUESTIONNAIRE, HOW DIFFICULT HAVE THESE PROBLEMS MADE IT FOR YOU TO DO YOUR WORK, TAKE CARE OF THINGS AT HOME, OR GET ALONG WITH OTHER PEOPLE: NOT DIFFICULT AT ALL
GAD7 TOTAL SCORE: 0
1. FEELING NERVOUS, ANXIOUS, OR ON EDGE: NOT AT ALL
2. NOT BEING ABLE TO STOP OR CONTROL WORRYING: NOT AT ALL
6. BECOMING EASILY ANNOYED OR IRRITABLE: NOT AT ALL
7. FEELING AFRAID AS IF SOMETHING AWFUL MIGHT HAPPEN: NOT AT ALL
4. TROUBLE RELAXING: NOT AT ALL

## 2018-10-11 ASSESSMENT — PAIN SCALES - GENERAL: PAINLEVEL: WORST PAIN (10)

## 2018-10-11 NOTE — MR AVS SNAPSHOT
After Visit Summary   10/11/2018    Debra Heart    MRN: [id number]           Patient Information     Date Of Birth          1955        Visit Information        Provider Department      10/11/2018 10:45 AM Kim Razo CNP [facility]        Today's Diagnoses     Right flank pain    -  1    History of kidney stones           Follow-ups after your visit        Who to contact     If you have questions or need follow up information about today's clinic visit or your schedule please contact [facility] directly at [phone].  Normal or non-critical lab and imaging results will be communicated to you by MyChart, letter or phone within 4 business days after the clinic has received the results. If you do not hear from us within 7 days, please contact the clinic through MyChart or phone. If you have a critical or abnormal lab result, we will notify you by phone as soon as possible.  Submit refill requests through [employer] or call your pharmacy and they will forward the refill request to us. Please allow 3 business days for your refill to be completed.          Additional Information About Your Visit        Care EveryWhere ID     This is your Care EveryWhere ID. This could be used by other organizations to access your [place] medical records  [id number]        Your Vitals Were     Pulse Breastfeeding? BMI (Body Mass Index)             64 No 33.46 kg/m2          Blood Pressure from Last 3 Encounters:   10/11/18 132/80   10/11/18 140/90   08/29/18 130/80    Weight from Last 3 Encounters:   10/11/18 193 lb (87.5 kg)   10/11/18 193 lb 9.6 oz (87.8 kg)   08/29/18 194 lb 14.4 oz (88.4 kg)              We Performed the Following     CBC and Differential     Comprehensive metabolic panel     Urinalysis w Reflex Microscopic If Positive     Urine Microscopic        Primary Care Provider Fax #    Physician No Ref-Primary [phone]       No address on file         Equal Access to Services     [facility] : Hadii camacho Corcoran, wadarrenkeron brennan, orianajoselito verdin. So [facility] [phone].    ATENCIÓN: Si habla español, tiene a justin disposición servicios gratuitos de asistencia lingüística. Llame al [phone].    We comply with applicable federal civil rights laws and [place] laws. We do not discriminate on the basis of race, color, national origin, age, disability, sex, sexual orientation, or gender identity.            Thank you!     Thank you for choosing [facility] AND [facility]  for your care. Our goal is always to provide you with excellent care. Hearing back from our patients is one way we can continue to improve our services. Please take a few minutes to complete the written survey that you may receive in the mail after your visit with us. Thank you!             Your Updated Medication List - Protect others around you: Learn how to safely use, store and throw away your medicines at www.disposemymeds.org.      Notice  As of 10/11/2018  1:37 PM    You have not been prescribed any medications.

## 2018-10-11 NOTE — ED AVS SNAPSHOT
" [facility]    [address]    [place] [address]    Phone:  [phone]    Fax:  [phone]                                       Debra Heart   MRN: [id number]    Department:  [facility]   Date of Visit:  10/11/2018           Patient Information     Date Of Birth          1955        Your diagnoses for this visit were:     Renal colic        You were seen by iMka Shaw MD.      Follow-up Information     Schedule an appointment as soon as possible for a visit to follow up.        Follow up with No Ref-Primary, Physician. Schedule an appointment as soon as possible for a visit in 1 week.        Discharge Instructions          * KIDNEY STONE (w/ Colic)    Make appointment for follow-up with your primary care provider in the very near future.  If your pain returns return to the emergency room for reevaluation  The sharp cramping pain and nausea/vomiting that you have is due to a small stone which has formed in the kidney and is now passing down a narrow tube (ureter) on its way to your bladder. Once it reaches your bladder, the pain will stop. The stone may pass in your urine stream in one piece. [The size may be 1/16\" to 1/4\" (1-6mm)]. Or, the stone may also break up into pedrito fragments which you may not even notice.  Once you have had a kidney stone there is a risk for recurrence in the future.  HOME CARE:      Drink lots of fluid (at least 8-10 glasses of water a day).    Most stones will pass on their own, but may take from a few hours to a few days.    Each time you urinate, do so in a jar. Pour the urine from the jar through the strainer and into the toilet. Continue doing this until 24 hours after your pain stops. By then, if there was a kidney stone, it should pass from your bladder. Some stones dissolve into sand-like particles and pass right through the strainer. In that case, you won't ever see a stone.    Save any stone that you " find in the strainer and bring it to your doctor for analysis. It may be possible to prevent certain types of stones from forming. Therefore, it is important to know what kind of stone you have.    Try to stay as active as possible since this will help the stone pass. Do not stay in bed unless your pain prevents you from getting up. You may notice a red, pink or brown color to your urine. This is normal while passing a kidney stone.  FOLLOW UP with your doctor or return to this facility if the pain lasts more than 48 hours.  GET PROMPT MEDICAL ATTENTION if any of the following occur:    Pain that is not controlled by the medicine given    Repeated vomiting or unable to keep down fluids    Weakness, dizziness or fainting    Fever over 101  F (38.3  C)    Passage of solid red or brown urine (can't see through it) or urine with lots of blood clots    Unable to pass urine for 8 hours and increasing bladder pressure    8020-3415 [employer]. [address] [place] [address]. All rights reserved. This information is not intended as a substitute for professional medical care. Always follow your healthcare professional's instructions.  This information has been modified by your health care provider with permission from the publisher.      24 Hour Appointment Hotline     To schedule an appointment at [facility], please call [phone]. If you don't have a family doctor or clinic, we will help you find one. [facility] clinics are conveniently located to serve the needs of you and your family.           Review of your medicines      Notice     You have not been prescribed any medications.            Procedures and tests performed during your visit     CBC with platelets differential    CRP inflammation    CT Abdomen Pelvis without Contrast (stone protocol)    Comprehensive metabolic panel    Peripheral IV catheter    Pulse oximetry nursing    Urine Culture Aerobic Bacterial    Vital signs      Orders  Needing Specimen Collection     None      Pending Results     No orders found from 10/9/2018 to 10/12/2018.            Pending Culture Results     No orders found from 10/9/2018 to 10/12/2018.            Pending Results Instructions     If you had any lab results that were not finalized at the time of your Discharge, you can call the ED Lab Result RN at [phone]. You will be contacted by this team for any positive Lab results or changes in treatment. The nurses are available 7 days a week from 10A to 6:30P.  You can leave a message 24 hours per day and they will return your call.        Thank you for choosing [facility]       Thank you for choosing [facility] for your care. Our goal is always to provide you with excellent care. Hearing back from our patients is one way we can continue to improve our services. Please take a few minutes to complete the written survey that you may receive in the mail after you visit with us. Thank you!        Care EveryWhere ID     This is your Care EveryWhere ID. This could be used by other organizations to access your [facility] medical records  [id number]        Equal Access to Services     ALEXANDER SCHULER : Brooks Corcoran, wadarrenda lujc, qaybta kaalmakeron borrero, joselito gonsales. So [facility] [phone].    ATENCIÓN: Si habla español, tiene a justin disposición servicios gratuitos de asistencia lingüística. Llame al [phone].    We comply with applicable federal civil rights laws and [place] laws. We do not discriminate on the basis of race, color, national origin, age, disability, sex, sexual orientation, or gender identity.            After Visit Summary       This is your record. Keep this with you and show to your community pharmacist(s) and doctor(s) at your next visit.

## 2018-10-11 NOTE — ED PROVIDER NOTES
History   No chief complaint on file.    HPI  Debra Heart is a 63 year old female who has a history of kidney stones presents with severe right flank and back pain that began about 3 hours ago today.  Patient is convinced this is a kidney stone again for her.  Pain level is at a 10 right now and she has not received any pain management yet.  She has given us a urine specimen which is pending.  Patient denies any abdominal pain she has had nausea and vomiting which is not unusual for her with kidney stones.  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  No coughing no fever no chills.  Patient does have some chronic low back problems but has not had surgeries.  Past medical history is well-documented as noted below    Problem List:    Patient Active Problem List    Diagnosis Date Noted     ACP (advance care planning) 03/05/2018     Priority: Medium     Displacement of lumbar intervertebral disc without myelopathy 02/01/2018     Priority: Medium     Overview:   times two       Renal calculus 02/01/2018     Priority: Medium     Overweight 10/03/2016     Priority: Medium     Hyperlipemia 12/31/2013     Priority: Medium        Past Medical History:    Past Medical History:   Diagnosis Date     Allergic contact dermatitis due to plants, except food      Calculus of kidney      Enthesopathy      Menstrual migraine without status migrainosus, not intractable      Other intervertebral disc displacement, lumbar region        Past Surgical History:    Past Surgical History:   Procedure Laterality Date     ARTHROSCOPY KNEE      Bilateral arthroscopy with lateral releases in 1996 and 1997.     COLONOSCOPY      2010,Due 2020     LAPAROSCOPIC TUBAL LIGATION      25     MAMMOPLASTY REDUCTION      1993     OTHER SURGICAL HISTORY      238127,OTHER,Toe surgery x 2     OTHER SURGICAL HISTORY       ",,BLADDER SUSPENSION     OTHER SURGICAL HISTORY      10/29/13,116453,OTHER,Right,[facility]     TONSILLECTOMY      24       Family History:    Family History   Problem Relation Age of Onset     Hypertension Father      Hypertension     Other - See Comments Father      Kidney failure     Hypertension Mother      Hypertension     HEART DISEASE Mother      Heart Disease, replaced heart valve     Arthritis Mother      Arthritis,Rheumatoid arthritis     HEART DISEASE Maternal Grandfather      Heart Disease, of diabetes     Breast Cancer Paternal Grandmother      Cancer-breast     Other - See Comments Sister      Acute intermittent porphyria, kidney transplant     Other - See Comments Sister      IBS     Family History Negative Brother      Good Health     Other - See Comments Brother      Hepatitis C     Family History Negative Sister      Good Health       Social History:  Marital Status:   [2]  Social History   Substance Use Topics     Smoking status: Never Smoker     Smokeless tobacco: Never Used     Alcohol use 0.5 oz/week      Comment: Alcoholic Drinks/day: occasionally        Medications:      No current outpatient prescriptions on file.      Review of Systems comprehensively unremarkable other than what has already been noted in the HPI with the patient having no cardiopulmonary symptoms but does have the nausea and vomiting with the flank pain    Physical Exam   BP: 157/84  Pulse: 65  Temp: 98.1  F (36.7  C)  Resp: 16  Height: 161.9 cm (5' 3.75\")  Weight: 87.5 kg (193 lb)  SpO2: 100 %      Physical Exam  Alert cooperative patient does not appear to be in any acute distress and is vitally stable with being afebrile, but does appear to be uncomfortable with her flank pain and is markedly tender to percussion on the right flank  Head and neck grossly normal  Chest clear to auscultation  Cardiovascular regular rate no murmur  Abdomen obese soft nontender with normal bowel sounds  Tenderness " into the right CVA area to even light percussion causing immediate severe pain  Orthopedic neurological exam is unremarkable    ED Course     ED Course     Procedures               Critical Care time:  none               Results for orders placed or performed during the hospital encounter of 10/11/18 (from the past 24 hour(s))   CBC with platelets differential   Result Value Ref Range    WBC 7.6 4.0 - 11.0 10e9/L    RBC Count 4.49 3.8 - 5.2 10e12/L    Hemoglobin 13.2 11.7 - 15.7 g/dL    Hematocrit 39.0 35.0 - 47.0 %    MCV 87 78 - 100 fl    MCH 29.4 26.5 - 33.0 pg    MCHC 33.8 31.5 - 36.5 g/dL    RDW 12.4 10.0 - 15.0 %    Platelet Count 231 150 - 450 10e9/L    Diff Method Automated Method     % Neutrophils 82.4 %    % Lymphocytes 11.6 %    % Monocytes 5.0 %    % Eosinophils 0.3 %    % Basophils 0.4 %    % Immature Granulocytes 0.3 %    Absolute Neutrophil 6.3 1.6 - 8.3 10e9/L    Absolute Lymphocytes 0.9 0.8 - 5.3 10e9/L    Absolute Monocytes 0.4 0.0 - 1.3 10e9/L    Absolute Eosinophils 0.0 0.0 - 0.7 10e9/L    Absolute Basophils 0.0 0.0 - 0.2 10e9/L    Abs Immature Granulocytes 0.0 0 - 0.4 10e9/L   CRP inflammation   Result Value Ref Range    CRP Inflammation 0.4 <0.5 mg/L   Comprehensive metabolic panel   Result Value Ref Range    Sodium 136 134 - 144 mmol/L    Potassium 3.8 3.5 - 5.1 mmol/L    Chloride 101 98 - 107 mmol/L    Carbon Dioxide 26 21 - 31 mmol/L    Anion Gap 9 3 - 14 mmol/L    Glucose 117 (H) 70 - 105 mg/dL    Urea Nitrogen 13 7 - 25 mg/dL    Creatinine 0.70 0.60 - 1.20 mg/dL    GFR Estimate 85 >60 mL/min/1.7m2    GFR Estimate If Black >90 >60 mL/min/1.7m2    Calcium 9.8 8.6 - 10.3 mg/dL    Bilirubin Total 0.7 0.3 - 1.0 mg/dL    Albumin 4.7 3.5 - 5.7 g/dL    Protein Total 7.5 6.4 - 8.9 g/dL    Alkaline Phosphatase 100 34 - 104 U/L    ALT 25 7 - 52 U/L    AST 20 13 - 39 U/L   CT Abdomen Pelvis without Contrast (stone protocol)    Narrative    PROCEDURE:  CT ABDOMEN PELVIS W/O CONTRAST    HISTORY:   Abdominal or flank pain;      TECHNIQUE:  Helical CT of the abdomen and pelvis was performed without  intravenous contrast. Sagittal and coronal reformatted images were  reviewed.    COMPARISON:  None.    FINDINGS:      The lung bases are clear.    The noncontrast appearance of the liver, spleen, pancreas and adrenal  glands is unremarkable. The gallbladder is present.    There are nonobstructive renal calculi bilaterally. There are multiple  parapelvic cysts on the right.    The bowel is normal in caliber. The appendix is unremarkable. There  are multiple diverticula in the colon without evidence of  diverticulitis.     There is no abdominal aortic aneurysm.    No free fluid, free air or adenopathy is present.      No suspicious osseous lesions are identified. There are degenerative  changes in the spine.      Impression    IMPRESSION:  No acute findings. Nonobstructive renal calculi.    SUKHWINDER FRIEND MD       Medications   lactated ringers BOLUS 1,000 mL (1,000 mLs Intravenous New Bag 10/11/18 1300)     Followed by   lactated ringers infusion (not administered)   sodium chloride 0.9% infusion (not administered)   HYDROmorphone (PF) (DILAUDID) injection 0.5 mg (not administered)   ondansetron (ZOFRAN) injection 4 mg (4 mg Intravenous Given 10/11/18 1301)   ketorolac (TORADOL) injection 15 mg (15 mg Intravenous Given 10/11/18 1300)       Assessments & Plan (with Medical Decision Making)     I have reviewed the nursing notes.    I have reviewed the findings, diagnosis, plan and need for follow up with the patient.   This patient would appear to have a recurrent renal stone problem and will have laboratory studies obtained and a CT scan without contrast.  Progress note at 2:20 PM: CT scan is unremarkable for any significant abnormalities.  There does not appear to be any obstructing renal stone.  With laboratory results also appearing to be unremarkable with a normal CBC and normal metabolic panel.    This patient  was not found to have a stone on CT scan and therefore will be discharged as she is now pain-free.  She is quite convinced that drinking virgin oil and lemon juice combination may have helped get rid of the stone that was obstructing her.  I encouraged her to have follow-up with her primary care provider and continue to drink large volumes of fluid which she readily admits that she had not been doing quite as reliably as she has in the past.  New Prescriptions    No medications on file       Final diagnoses:   Renal colic       10/11/2018   [facility]     Mika Shaw MD  10/11/18 [id number]

## 2018-10-11 NOTE — DISCHARGE INSTRUCTIONS
"   * KIDNEY STONE (w/ Colic)    Make appointment for follow-up with your primary care provider in the very near future.  If your pain returns return to the emergency room for reevaluation  The sharp cramping pain and nausea/vomiting that you have is due to a small stone which has formed in the kidney and is now passing down a narrow tube (ureter) on its way to your bladder. Once it reaches your bladder, the pain will stop. The stone may pass in your urine stream in one piece. [The size may be 1/16\" to 1/4\" (1-6mm)]. Or, the stone may also break up into pedrito fragments which you may not even notice.  Once you have had a kidney stone there is a risk for recurrence in the future.  HOME CARE:      Drink lots of fluid (at least 8-10 glasses of water a day).    Most stones will pass on their own, but may take from a few hours to a few days.    Each time you urinate, do so in a jar. Pour the urine from the jar through the strainer and into the toilet. Continue doing this until 24 hours after your pain stops. By then, if there was a kidney stone, it should pass from your bladder. Some stones dissolve into sand-like particles and pass right through the strainer. In that case, you won't ever see a stone.    Save any stone that you find in the strainer and bring it to your doctor for analysis. It may be possible to prevent certain types of stones from forming. Therefore, it is important to know what kind of stone you have.    Try to stay as active as possible since this will help the stone pass. Do not stay in bed unless your pain prevents you from getting up. You may notice a red, pink or brown color to your urine. This is normal while passing a kidney stone.  FOLLOW UP with your doctor or return to this facility if the pain lasts more than 48 hours.  GET PROMPT MEDICAL ATTENTION if any of the following occur:    Pain that is not controlled by the medicine given    Repeated vomiting or unable to keep down fluids    Weakness, " dizziness or fainting    Fever over 101  F (38.3  C)    Passage of solid red or brown urine (can't see through it) or urine with lots of blood clots    Unable to pass urine for 8 hours and increasing bladder pressure    2736-1517 [employer]. [address] [place] [address]27. All rights reserved. This information is not intended as a substitute for professional medical care. Always follow your healthcare professional's instructions.  This information has been modified by your health care provider with permission from the publisher.

## 2018-10-11 NOTE — ED TRIAGE NOTES
Pt comes from the clinic. Pt has h/o kidney stones and believes she is having another one. Pt had a urine sample sent to lab in clinic as well as her blood drawn. No CT scan has been done at this point.    Berenice Valentin RN on 10/11/2018 at 11:32 AM

## 2018-10-11 NOTE — ED AVS SNAPSHOT
[facility]    [address]    [place] [address]    Phone:  [phone]    Fax:  [phone]                                       Debra Heart   MRN: [id number]    Department:  [facility]   Date of Visit:  10/11/2018           After Visit Summary Signature Page     I have received my discharge instructions, and my questions have been answered. I have discussed any challenges I see with this plan with the nurse or doctor.    ..........................................................................................................................................  Patient/Patient Representative Signature      ..........................................................................................................................................  Patient Representative Print Name and Relationship to Patient    ..................................................               ................................................  Date                                   Time    ..........................................................................................................................................  Reviewed by Signature/Title    ...................................................              ..............................................  Date                                               Time          22EPIC Rev 08/18

## 2018-10-11 NOTE — PROGRESS NOTES
SUBJECTIVE:   Debra Heart is a 63 year old female who presents to clinic today for the following health issues:      Concern - Kidney Stone    Onset: This morning    Description: Pain that started in right flank this morning.    Intensity: Currently 15/10- constant, cramping, contraction    Progression of Symptoms:  worsening    Accompanying Signs & Symptoms: Denies dysuria, urgency, feeling of increased pressure/retention, hematuria. She does have nausea and has had some vomiting.    Previous history of similar problem: Yes, has a history of kidney stones. Last kidney stone was in 1996    Precipitating factors:   Worsened by: Nothing    Alleviating factors:  Improved by: Nothing    Therapies Tried and outcome: Remedy of lemon juice and oilice oil- threw it up, has not taken anything for pain        Problem list and histories reviewed & adjusted, as indicated.  Additional history: as documented    Patient Active Problem List   Diagnosis     Displacement of lumbar intervertebral disc without myelopathy     Hyperlipemia     Overweight     Renal calculus     ACP (advance care planning)     Past Surgical History:   Procedure Laterality Date     ARTHROSCOPY KNEE      Bilateral arthroscopy with lateral releases in 1996 and 1997.     COLONOSCOPY      2010,Due 2020     LAPAROSCOPIC TUBAL LIGATION      25     MAMMOPLASTY REDUCTION      1993     OTHER SURGICAL HISTORY      [id number],OTHER,Toe surgery x 2     OTHER SURGICAL HISTORY      2010,,BLADDER SUSPENSION     OTHER SURGICAL HISTORY      10/29/13,197000,OTHER,Right,[facility]     TONSILLECTOMY      24       Social History   Substance Use Topics     Smoking status: Never Smoker     Smokeless tobacco: Never Used     Alcohol use 0.5 oz/week      Comment: Alcoholic Drinks/day: occasionally     Family History   Problem Relation Age of Onset     Hypertension Father      Hypertension     Other - See Comments Father      Kidney failure     Hypertension Mother       Hypertension     HEART DISEASE Mother      Heart Disease, replaced heart valve     Arthritis Mother      Arthritis,Rheumatoid arthritis     HEART DISEASE Maternal Grandfather      Heart Disease, of diabetes     Breast Cancer Paternal Grandmother      Cancer-breast     Other - See Comments Sister      Acute intermittent porphyria, kidney transplant     Other - See Comments Sister      IBS     Family History Negative Brother      Good Health     Other - See Comments Brother      Hepatitis C     Family History Negative Sister      Good Health         No current outpatient prescriptions on file.     Allergies   Allergen Reactions     Tramadol Rash     Recent Labs   Lab Test  18   1335  17   1048  10/03/16   1109  08/13/15   1228   LDL   --   123*  153*  143*   HDL   --   43  45  39   TRIG   --   261*  218*  149   ALT  43   --    --    --    CR   --   0.75  0.67*  0.76   GFRESTBLACK   --   >60  >60  >60   POTASSIUM   --   4.2  4.2  4.0      BP Readings from Last 3 Encounters:   10/11/18 140/90   18 130/80   18 118/76    Wt Readings from Last 3 Encounters:   10/11/18 193 lb 9.6 oz (87.8 kg)   18 194 lb 14.4 oz (88.4 kg)   18 188 lb 6.4 oz (85.5 kg)                    Reviewed and updated as needed this visit by clinical staff  Tobacco  Allergies  Meds  Med Hx  Surg Hx  Fam Hx  Soc Hx      Reviewed and updated as needed this visit by Provider         ROS:    Constitutional, HEENT, cardiovascular, pulmonary, gi and gu systems are negative, except as otherwise noted.    OBJECTIVE:     /90 (BP Location: Right arm, Patient Position: Sitting, Cuff Size: Adult Large)  Pulse 64  Wt 193 lb 9.6 oz (87.8 kg)  Breastfeeding? No  BMI 33.46 kg/m2  Body mass index is 33.46 kg/(m^2).     GENERAL: healthy, alert and no distress  EYES: Eyes grossly normal to inspection  HENT: normocephalic, atraumatic  RESP: lungs clear to auscultation - no rales, rhonchi or wheezes  CV: regular rate  and rhythm, normal S1 S2, no S3 or S4, no murmur, click or rub  NEURO: Normal strength and tone, mentation intact and speech normal  PSYCH: mentation appears normal, patient hardly able to sit, stand, walk or stay in 1 position for more than a few seconds due to discomfort.    Diagnostic Test Results:  Results for orders placed or performed in visit on 10/11/18 (from the past 24 hour(s))   CBC and Differential   Result Value Ref Range    WBC 6.0 4.0 - 11.0 10e9/L    RBC Count 4.60 3.8 - 5.2 10e12/L    Hemoglobin 13.4 11.7 - 15.7 g/dL    Hematocrit 39.8 35.0 - 47.0 %    MCV 87 78 - 100 fl    MCH 29.1 26.5 - 33.0 pg    MCHC 33.7 31.5 - 36.5 g/dL    RDW 12.5 10.0 - 15.0 %    Platelet Count 247 150 - 450 10e9/L    Diff Method Automated Method     % Neutrophils 72.8 %    % Lymphocytes 18.4 %    % Monocytes 6.5 %    % Eosinophils 1.3 %    % Basophils 0.7 %    % Immature Granulocytes 0.3 %    Absolute Neutrophil 4.3 1.6 - 8.3 10e9/L    Absolute Lymphocytes 1.1 0.8 - 5.3 10e9/L    Absolute Monocytes 0.4 0.0 - 1.3 10e9/L    Absolute Eosinophils 0.1 0.0 - 0.7 10e9/L    Absolute Basophils 0.0 0.0 - 0.2 10e9/L    Abs Immature Granulocytes 0.0 0 - 0.4 10e9/L   Comprehensive metabolic panel   Result Value Ref Range    Sodium 137 134 - 144 mmol/L    Potassium 4.1 3.5 - 5.1 mmol/L    Chloride 102 98 - 107 mmol/L    Carbon Dioxide 26 21 - 31 mmol/L    Anion Gap 9 3 - 14 mmol/L    Glucose 136 (H) 70 - 105 mg/dL    Urea Nitrogen 14 7 - 25 mg/dL    Creatinine 0.78 0.60 - 1.20 mg/dL    GFR Estimate 75 >60 mL/min/1.7m2    GFR Estimate If Black >90 >60 mL/min/1.7m2    Calcium 9.9 8.6 - 10.3 mg/dL    Bilirubin Total 0.7 0.3 - 1.0 mg/dL    Albumin 4.8 3.5 - 5.7 g/dL    Protein Total 7.6 6.4 - 8.9 g/dL    Alkaline Phosphatase 105 (H) 34 - 104 U/L    ALT 26 7 - 52 U/L    AST 21 13 - 39 U/L   Urinalysis w Reflex Microscopic If Positive   Result Value Ref Range    Color Urine Yellow     Appearance Urine Clear     Glucose Urine Negative  NEG^Negative mg/dL    Bilirubin Urine Negative NEG^Negative    Ketones Urine Negative NEG^Negative mg/dL    Specific Gravity Urine >1.030 (H) 1.000 - 1.030    Blood Urine Large (A) NEG^Negative    pH Urine 5.0 5.0 - 9.0 pH    Protein Albumin Urine Negative NEG^Negative mg/dL    Urobilinogen Urine 0.2 0.2 - 1.0 EU/dL    Nitrite Urine Negative NEG^Negative    Leukocyte Esterase Urine Trace (A) NEG^Negative    Source Midstream Urine    Urine Microscopic   Result Value Ref Range    WBC Urine 0 - 5 OTO5^0 - 5 /HPF    RBC Urine 5-10 (A) OTO2^O - 2 /HPF    Bacteria Urine Few (A) NEG^Negative /HPF       ASSESSMENT/PLAN:     1. Right flank pain  Acute, symptomatic  - CBC and Differential; Future  - Comprehensive metabolic panel; Future  - Urinalysis w Reflex Microscopic If Positive  - CBC and Differential  - Comprehensive metabolic panel    2. History of kidney stones  Historical- last was in 1997 per patient  - CBC and Differential; Future  - Comprehensive metabolic panel; Future  - Urinalysis w Reflex Microscopic If Positive  - CBC and Differential  - Comprehensive metabolic panel    Discussed options with patient in regards to continuing with work-up in the clinic versus going to the ED. Given she is having nausea, vomiting, severe pain making walking difficult and limited ability to manage this in the clinic she would like to go to the ED. Patient wheeled to the ED by nursing.        Kim Razo, [facility] AND [facility]

## 2018-10-12 ASSESSMENT — ANXIETY QUESTIONNAIRES: GAD7 TOTAL SCORE: 0

## 2018-10-12 ASSESSMENT — PATIENT HEALTH QUESTIONNAIRE - PHQ9: SUM OF ALL RESPONSES TO PHQ QUESTIONS 1-9: 0

## 2018-10-14 LAB
BACTERIA SPEC CULT: NORMAL
SPECIMEN SOURCE: NORMAL

## 2019-02-04 ENCOUNTER — HOSPITAL ENCOUNTER (OUTPATIENT)
Dept: MAMMOGRAPHY | Facility: OTHER | Age: 64
Discharge: HOME | End: 2019-02-04
Attending: FAMILY MEDICINE | Admitting: FAMILY MEDICINE
Payer: COMMERCIAL

## 2019-02-04 ENCOUNTER — OFFICE VISIT (OUTPATIENT)
Dept: FAMILY MEDICINE | Facility: OTHER | Age: 64
End: 2019-02-04
Attending: FAMILY MEDICINE
Payer: COMMERCIAL

## 2019-02-04 VITALS
HEIGHT: 64 IN | DIASTOLIC BLOOD PRESSURE: 76 MMHG | BODY MASS INDEX: 33.19 KG/M2 | SYSTOLIC BLOOD PRESSURE: 128 MMHG | TEMPERATURE: 97.3 F | WEIGHT: 194.4 LBS | HEART RATE: 76 BPM

## 2019-02-04 DIAGNOSIS — M54.5 CHRONIC RIGHT-SIDED LOW BACK PAIN, WITH SCIATICA PRESENCE UNSPECIFIED: ICD-10-CM

## 2019-02-04 DIAGNOSIS — Z12.39 SCREENING BREAST EXAMINATION: ICD-10-CM

## 2019-02-04 DIAGNOSIS — Z76.89 ENCOUNTER TO ESTABLISH CARE WITH NEW DOCTOR: Primary | ICD-10-CM

## 2019-02-04 DIAGNOSIS — G89.29 CHRONIC RIGHT-SIDED LOW BACK PAIN, WITH SCIATICA PRESENCE UNSPECIFIED: ICD-10-CM

## 2019-02-04 PROBLEM — Z87.442 HISTORY OF KIDNEY STONES: Status: ACTIVE | Noted: 2019-02-04

## 2019-02-04 PROBLEM — N20.0 RENAL CALCULUS: Status: RESOLVED | Noted: 2018-02-01 | Resolved: 2019-02-04

## 2019-02-04 PROCEDURE — 77063 BREAST TOMOSYNTHESIS BI: CPT

## 2019-02-04 PROCEDURE — 99213 OFFICE O/P EST LOW 20 MIN: CPT | Performed by: FAMILY MEDICINE

## 2019-02-04 ASSESSMENT — ENCOUNTER SYMPTOMS
CONSTITUTIONAL NEGATIVE: 1
NECK PAIN: 1
RESPIRATORY NEGATIVE: 1
MYALGIAS: 0
BACK PAIN: 1
NECK STIFFNESS: 1
ARTHRALGIAS: 1
CARDIOVASCULAR NEGATIVE: 1

## 2019-02-04 ASSESSMENT — MIFFLIN-ST. JEOR: SCORE: 1427.82

## 2019-02-04 NOTE — PATIENT INSTRUCTIONS
Patient Education     Prevention Guidelines, Women Ages 50 to 64  Screening tests and vaccines are an important part of managing your health. A screening test is done to find possible disorders or diseases in people who don't have any symptoms. The goal is to find a disease early so lifestyle changes can be made and you can be watched more closely to reduce the risk of disease, or to detect it early enough to treat it most effectively. Screening tests are not considered diagnostic, but are used to determine if more testing is needed. Health counseling is essential, too. Below are guidelines for these, for women ages 50 to 64. Talk with your healthcare provider to make sure you re up to date on what you need.  Screening Who needs it How often   Type 2 diabetes or prediabetes All women beginning at age 45 and women without symptoms at any age who are overweight or obese and have 1 or more additional risk factors for diabetes. At  least every 3 years   Type 2 diabetes or prediabetes All women diagnosed with gestational diabetes Lifelong testing every 3 years   Type 2 diabetes All women with prediabetes Every year   Alcohol misuse All women in this age group At routine exams   Blood pressure All women in this age group Yearly checkup if your blood pressure is normal  Normal blood pressure is less than 120/80 mm Hg  If your blood pressure reading is higher than normal, follow the advice of your healthcare provider   Breast cancer All women at average risk in this age group Yearly mammogram should be done until age 54. At age 55, switch to mammograms every other year or choose to continue yearly mammograms.  All women should be familiar with the potential benefits and risks of breast cancer screening with mammograms.      Cervical cancer All women in this age group, except women who have had a complete hysterectomy Pap test every 3 years or Pap test with human papillomavirus (HPV) test every 5 years   Chlamydia Women at  increased risk for infection At routine exams   Colorectal cancer All women in this age group Flexible sigmoidoscopy every 5 years, or colonoscopy every 10 years, or double-contrast barium enema every 5 years; yearly fecal occult blood test or fecal immunochemical test; or a stool DNA test as often as your health care provider advises; talk with your health care provider about which tests are best for you   Depression All women in this age group At routine exams   Gonorrhea Sexually active women at increased risk for infection At routine exams   Hepatitis C Anyone at increased risk; 1 time for those born between 1945 and 1965 At routine exams   High cholesterol or triglycerides All women in this age group who are at risk for coronary artery disease At least every 5 years   HIV All women At routine exams   Lung cancer Adults age 55 to 80 who have smoked Yearly screening in smokers with 30 pack-year history of smoking or who quit within 15 years   Obesity All women in this age group At routine exams   Osteoporosis Women who are postmenopausal Ask your healthcare provider   Syphilis Women at increased risk for infection - talk with your healthcare provider At routine exams   Tuberculosis Women at increased risk for infection - talk with your healthcare provider Ask your healthcare provider   Vision All women in this age group Ask your healthcare provider   Vaccine Who needs it How often   Chickenpox (varicella) All women in this age group who have no record of this infection or vaccine 2 doses; the second dose should be given at least 4 weeks after the first dose   Hepatitis A Women at increased risk for infection - talk with your healthcare provider 2 doses given at least 6 months apart   Hepatitis B Women at increased risk for infection - talk with your healthcare provider 3 doses over 6 months; second dose should be given 1 month after the first dose; the third dose should be given at least 2 months after the second  dose and at least 4 months after the first dose   Haemophilus influenzaeType B (HIB) Women at increased risk for infection - talk with your healthcare provider 1 to 3 doses   Influenza (flu) All women in this age group Once a year   Measles, mumps, rubella (MMR) Women in this age group through their late 50s who have no record of these infections or vaccines 1 dose   Meningococcal Women at increased risk for infection - talk with your healthcare provider 1 or more doses   Pneumococcal conjugate vaccine (PCV13) and pneumococcal polysaccharide vaccine (PPSV23) Women at increased risk for infection - talk with your healthcare provider PCV13: 1 dose ages 19 to 65 (protects against 13 types of pneumococcal bacteria)  PPSV23: 1 to 2 doses through age 64, or 1 dose at 65 or older (protects against 23 types of pneumococcal bacteria)   Tetanus/diphtheria/pertussis (Td/Tdap) booster All women in this age group Td every 10 years, or a one-time dose of Tdap instead of a Td booster after age 18, then Td every 10 years   Zoster All women ages 60 and older 1 dose   Counseling Who needs it How often   BRCA gene mutation testing for breast and ovarian cancer susceptibility Women with increased risk for having gene mutation When your risk is known   Breast cancer and chemoprevention Women at high risk for breast cancer When your risk is known   Diet and exercise Women who are overweight or obese When diagnosed, and then at routine exams   Sexually transmitted infection prevention Women at increased risk for infection - talk with your healthcare provider At routine exams   Use of daily aspirin Women ages 55 and up in this age group who are at risk for cardiovascular health problems such as stroke When your risk is known   Use of tobacco and the health effects it can cause All women in this age group Every exam   1[employer]  Date Last Reviewed: 1/26/2016 2000-2018 [employer]. [address] [facility],  [place] [employer] [address]. All rights reserved. This information is not intended as a substitute for professional medical care. Always follow your healthcare professional's instructions.

## 2019-02-04 NOTE — NURSING NOTE
Patient presents to clinic for health maintenance.  Alicia Herrera ....................  2/4/2019   11:02 AM

## 2019-02-04 NOTE — NURSING NOTE
Patient presents to clinic to establish care with new provider.  Alicia Herrera ....................  2/4/2019   11:09 AM
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
## 2019-07-22 DIAGNOSIS — L23.7 CONTACT DERMATITIS DUE TO POISON IVY: Primary | ICD-10-CM

## 2019-07-22 RX ORDER — HYDROXYZINE HYDROCHLORIDE 25 MG/1
25 TABLET, FILM COATED ORAL 3 TIMES DAILY PRN
Qty: 15 TABLET | Refills: 2 | Status: SHIPPED | OUTPATIENT
Start: 2019-07-22 | End: 2020-08-24

## 2019-07-22 RX ORDER — PREDNISONE 20 MG/1
40 TABLET ORAL DAILY
Qty: 10 TABLET | Refills: 3 | Status: SHIPPED | OUTPATIENT
Start: 2019-07-22 | End: 2019-07-27

## 2019-10-01 PROBLEM — M54.50 CHRONIC RIGHT-SIDED LOW BACK PAIN: Status: ACTIVE | Noted: 2019-02-04

## 2020-08-21 NOTE — PROGRESS NOTES
"Nursing Notes:   Alicia Crarion LPN  8/24/2020 10:31 AM  Signed  Patient presents to clinic for annual physical.  Alicia Carrion LPN ....................  8/24/2020   10:31 AM      ANNUAL PHYSICAL - FEMALE    HPI: Debra Heart who presents for a yearly exam.  Concerns include:     Right Clavicle:  Patient states that about one year ago she was using her leaf blower for approximately 8 hours in a row and she developed some right clavicular pain. She notes that over the right sternoclavicular joint there is some \"growth\" both visibly and palpably. No pain associated with it anymore but she would like it further examined today.     Knee Pain:  Patient states about one year ago she was walking when she stepped in a hole with her left foot. She immediately developed left knee pain. She treated it symptomatically and that did help some. Notes the pain is on and off. Also notes she has pain in her right knee that developed approximately one year ago as well. No known injury. Does note a small effusion in right medial lower knee. No associated numbness or tingling, overlying skin changes.     Hand and finger pain:  Patient notes that for several years she has had pain in her bilateral hands and fingers that is worst in the morning. She does work with her hands daily for most of her adult life. She is concerned about possibility of rheumatoid arthritis given a strong family history of it. She has never been evaluated for this and would like labs to be completed today as we are already drawing labs. Treats symptoms as home as needed.     Skin:  Patient states she gets poison ivy quite frequently. Does well with hydroxyzine. Requesting refill today. Also requesting prednisone which has helped in the past as she has a current outbreak starting now.     Kidneys:  Patient gets frequent kidney stones. She does have some suprapubic pressure right now and is concerned it could be the start of a UTI. No fever/chills, " sweats, urinary frequency or urgency, dysuria, hematuria, flank pain.         No LMP recorded. Patient is postmenopausal.   Contraception: Postmenopausal  Risk for STI?: no  Last pap: 10/2016, Due  Any hx of abnormal paps:  no  FH of early CA?:   Cholesterol/DM concerns/screening: Due  Tobacco?: no  Calcium intake: no  DEXA: N/A  Last mammo: 02/2019, due  Colonoscopy: 01/2010, due  Immunizations: Up to date other than Shingrex.     Patient Active Problem List    Diagnosis Date Noted     History of kidney stones 02/04/2019     Priority: Medium     Chronic right-sided low back pain, with sciatica presence unspecified 02/04/2019     Priority: Medium     ACP (advance care planning) 03/05/2018     Priority: Medium     Displacement of lumbar intervertebral disc without myelopathy 02/01/2018     Priority: Medium     Overview:   times two       Overweight 10/03/2016     Priority: Medium     Other hyperlipidemia 12/31/2013     Priority: Medium       Past Medical History:   Diagnosis Date     Allergic contact dermatitis due to plants, except food     Recurring poison ivy     Calculus of kidney     Kidney stones (calcium).     Enthesopathy     Right great toe bone spur.     Menstrual migraine without status migrainosus, not intractable     Menstrual migraine     Other intervertebral disc displacement, lumbar region     1999,Herniated disc (lumbar) x's two.       Past Surgical History:   Procedure Laterality Date     1ST MTP FUSION, 2ND HAMMERTOE REPAIR Right 10/29/2013     ARTHROSCOPY KNEE  1996    Bilateral arthroscopy with lateral releases in 1996 and 1997.     COLONOSCOPY  01/01/2010 2010,Due 2020     LAPAROSCOPIC TUBAL LIGATION      25     MAMMOPLASTY REDUCTION  1993     SLING BLADDER SUSPENSION WITH FASCIA SHARA  2010     TOE SURGERY      x2     TONSILLECTOMY      24       Family History   Problem Relation Age of Onset     Hypertension Father      Kidney failure Father      Irritable Bowel Syndrome Sister       "Hypertension Mother      Heart Disease Mother         Heart Disease, replaced heart valve     Arthritis Mother         Arthritis,Rheumatoid arthritis     Other - See Comments Sister         Acute intermittent porphyria, kidney transplant     Heart Disease Maternal Grandfather         Heart Disease, of diabetes     Breast Cancer Paternal Grandmother         Cancer-breast       Social History     Tobacco Use     Smoking status: Never Smoker     Smokeless tobacco: Never Used   Substance Use Topics     Alcohol use: Yes     Alcohol/week: 0.8 standard drinks     Comment: Alcoholic Drinks/day: occasionally/socially       Current Outpatient Medications   Medication Sig Dispense Refill     hydrOXYzine (ATARAX) 25 MG tablet Take 1 tablet (25 mg) by mouth 3 times daily as needed for itching 15 tablet 2       Allergies   Allergen Reactions     Clindamycin Anaphylaxis     Tramadol Rash       REVIEW OF SYSTEMS:  Refer to HPI.    PHYSICAL EXAM:  /78   Pulse 74   Temp 96.9  F (36.1  C)   Resp 12   Ht 1.6 m (5' 3\")   Wt 86.8 kg (191 lb 6.4 oz)   Breastfeeding No   BMI 33.90 kg/m    CONSTITUTIONAL:  Alert,cooperative, NAD.  EYES: No scleral icterus.  PERRLA.  Conjunctiva clear.  ENT/MOUTH: External ears and nose normal.  TMs normal.  Moist mucous membranes. Oropharynx clear.    ENDO: No thyromegaly or thyroidnodules.  LYMPH:  No cervical or supraclavicular LA.    CARDIOVASCULAR: Regular,S1, S2.  No S3 or S4.  No murmur/gallop/rub.  No peripheral edema.  RESPIRATORY: CTA bilaterally, no wheezes, rhonchi or rales.  GI: Bowel sounds wnl.  Soft, nontender, nondistended.  No masses or HSM.  No rebound or guarding.  : Vulva: normal, no lesions or discharge  Urethral meatus: normal size and location, no lesions or discharge  Urethra: no tenderness or masses  Bladder: no fullness or tenderness  Vagina: normal appearance, no abnormal discharge, no lesions.  No evidence of cystocele or rectocele.  Cervix: normal appearance, " no lesions, no abnormal discharge, no cervical motion tenderness  Uterus: normal size and position, mobile, non-tender  Adnexa: no palpable masses bilaterally. No cervical motion tenderness.  Pap smear obtained  MSKEL:    Clavicle: No tenderness to palpation bilaterally. Visible and palpable enlargement over right sternoclavicular joint.    Knees: No tenderness to palpation. Full active and passive ROM. Negative steinmann's test, negative anterior and posterior drawer tests, negative varus and valgus stress tests. Small possible effusion on right medial lower knee.   INTEGUMENTARY:  Warm, dry.  No rash noted on exposed skin.  NEUROLOGIC: Facies symmetric.  Grossly normal movement and tone.  No tremor.  PSYCHIATRIC: Affect normal.  Speech fluent.      PHQ Depression Screen  PHQ-9 SCORE 10/3/2016 2/28/2018 10/11/2018   PHQ-9 Total Score 0 0 0       Labs:  No results found for any visits on 08/24/20.    ASSESSMENT AND PLAN:      ICD-10-CM    1. Encounter for routine adult health examination without abnormal findings  Z00.00 CBC W PLT No Diff     Comprehensive Metabolic Panel     Comprehensive Metabolic Panel     CBC W PLT No Diff   2. Cervical cancer screening  Z12.4 HPV High Risk Types DNA Cervical     Pap Screen Thin Prep   3. Other hyperlipidemia  E78.49 Lipid Panel     Lipid Panel   4. Contact dermatitis due to poison ivy  L23.7 hydrOXYzine (ATARAX) 25 MG tablet   5. Chronic pain of both knees  M25.561 XR Knee Standing 2v Bilateral & 2v Bilateral    M25.562 CANCELED: XR Knee Right 3 Views    G89.29 CANCELED: XR Knee Left 3 Views   6. Knee injury, left, initial encounter  S89.92XA XR Knee Standing 2v Bilateral & 2v Bilateral     CANCELED: XR Knee Left 3 Views   7. Pain of right clavicle  M89.8X1 XR Clavicle Right 2 Views   8. Screening for diabetes mellitus  Z13.1 Hemoglobin A1c     Hemoglobin A1c   9. Encounter for screening mammogram for breast cancer  Z12.31 MA Screen Bilateral w/Andrzej   10. Pain in finger of both  hands  M79.645 Rheumatoid factor    M79.644 Anti Nuclear Ena IgG by IFA with Reflex     Cyclic Citrullinated Peptide Antibody IgG     Rheumatoid factor     Cyclic Citrullinated Peptide Antibody IgG       1. Baseline labs ordered as outlined above. Results pending, will notify patient with results. Relevant cancer screening discussed.  Counseled on healthy diet, Calcium and vitamin D intake, and exercise. Due for colonoscopy and patient will contact her hospital that she prefers this to be completed.   2. Pap smear and HPV screening obtained today. Results pending, will notify patient with results. If all is negative no repeat screening indicated for patient in the future.   3. Lipid panel ordered. Results pending, will notify patient with results and treat if indicated.   4. Refill on hydroxyzine. Educated on medication, use, and side effects.   5, 6. X-rays ordered. Results as outlined above. Symptoms likely associated with OA. Encouraged symptomatic management at this time. Follow up as needed.   7. X-rays ordered. Results as outlined above. Possible she dislocated clavicle a year ago, but not other causes found today. No treatment indicated.   8. Hemoglobin A1c ordered. Results pending, will notify patient with results and treat if indicated.   9. Mammogram order placed. Patient will schedule this at her convenience.   10. Rheumatoid labs ordered as outlined above. Results pending, will notify patient with results. Discussed with patient that symptoms and exam are more consistent with osteoarthritis, however did complete lab work per her request. Encouraged symptomatic management at this time.       Clementina Cam PA-C

## 2020-08-24 ENCOUNTER — HOSPITAL ENCOUNTER (OUTPATIENT)
Dept: GENERAL RADIOLOGY | Facility: OTHER | Age: 65
End: 2020-08-24
Attending: PHYSICIAN ASSISTANT
Payer: COMMERCIAL

## 2020-08-24 ENCOUNTER — OFFICE VISIT (OUTPATIENT)
Dept: FAMILY MEDICINE | Facility: OTHER | Age: 65
End: 2020-08-24
Attending: PHYSICIAN ASSISTANT
Payer: COMMERCIAL

## 2020-08-24 VITALS
HEIGHT: 63 IN | TEMPERATURE: 96.9 F | HEART RATE: 74 BPM | RESPIRATION RATE: 12 BRPM | BODY MASS INDEX: 33.91 KG/M2 | WEIGHT: 191.4 LBS | DIASTOLIC BLOOD PRESSURE: 78 MMHG | SYSTOLIC BLOOD PRESSURE: 138 MMHG

## 2020-08-24 DIAGNOSIS — M89.8X1 PAIN OF RIGHT CLAVICLE: ICD-10-CM

## 2020-08-24 DIAGNOSIS — Z13.1 SCREENING FOR DIABETES MELLITUS: ICD-10-CM

## 2020-08-24 DIAGNOSIS — Z12.4 CERVICAL CANCER SCREENING: ICD-10-CM

## 2020-08-24 DIAGNOSIS — Z00.00 ENCOUNTER FOR ROUTINE ADULT HEALTH EXAMINATION WITHOUT ABNORMAL FINDINGS: Primary | ICD-10-CM

## 2020-08-24 DIAGNOSIS — M25.561 CHRONIC PAIN OF BOTH KNEES: ICD-10-CM

## 2020-08-24 DIAGNOSIS — M79.644 PAIN IN FINGER OF BOTH HANDS: ICD-10-CM

## 2020-08-24 DIAGNOSIS — Z12.31 ENCOUNTER FOR SCREENING MAMMOGRAM FOR BREAST CANCER: ICD-10-CM

## 2020-08-24 DIAGNOSIS — G89.29 CHRONIC PAIN OF BOTH KNEES: ICD-10-CM

## 2020-08-24 DIAGNOSIS — M25.562 CHRONIC PAIN OF BOTH KNEES: ICD-10-CM

## 2020-08-24 DIAGNOSIS — E78.49 OTHER HYPERLIPIDEMIA: ICD-10-CM

## 2020-08-24 DIAGNOSIS — M79.645 PAIN IN FINGER OF BOTH HANDS: ICD-10-CM

## 2020-08-24 DIAGNOSIS — R10.2 SUPRAPUBIC PRESSURE: ICD-10-CM

## 2020-08-24 DIAGNOSIS — S89.92XA KNEE INJURY, LEFT, INITIAL ENCOUNTER: ICD-10-CM

## 2020-08-24 DIAGNOSIS — L23.7 CONTACT DERMATITIS DUE TO POISON IVY: ICD-10-CM

## 2020-08-24 LAB
ALBUMIN SERPL-MCNC: 4.7 G/DL (ref 3.5–5.7)
ALBUMIN UR-MCNC: NEGATIVE MG/DL
ALP SERPL-CCNC: 93 U/L (ref 34–104)
ALT SERPL W P-5'-P-CCNC: 31 U/L (ref 7–52)
ANION GAP SERPL CALCULATED.3IONS-SCNC: 8 MMOL/L (ref 3–14)
APPEARANCE UR: ABNORMAL
AST SERPL W P-5'-P-CCNC: 25 U/L (ref 13–39)
BACTERIA #/AREA URNS HPF: ABNORMAL /HPF
BILIRUB SERPL-MCNC: 0.7 MG/DL (ref 0.3–1)
BILIRUB UR QL STRIP: NEGATIVE
BUN SERPL-MCNC: 16 MG/DL (ref 7–25)
CALCIUM SERPL-MCNC: 9.3 MG/DL (ref 8.6–10.3)
CHLORIDE SERPL-SCNC: 104 MMOL/L (ref 98–107)
CHOLEST SERPL-MCNC: 221 MG/DL
CO2 SERPL-SCNC: 28 MMOL/L (ref 21–31)
COLOR UR AUTO: YELLOW
CREAT SERPL-MCNC: 0.72 MG/DL (ref 0.6–1.2)
ERYTHROCYTE [DISTWIDTH] IN BLOOD BY AUTOMATED COUNT: 12.4 % (ref 10–15)
GFR SERPL CREATININE-BSD FRML MDRD: 82 ML/MIN/{1.73_M2}
GLUCOSE SERPL-MCNC: 101 MG/DL (ref 70–105)
GLUCOSE UR STRIP-MCNC: NEGATIVE MG/DL
HBA1C MFR BLD: 6 % (ref 4–6)
HCT VFR BLD AUTO: 43.7 % (ref 35–47)
HDLC SERPL-MCNC: 44 MG/DL (ref 23–92)
HGB BLD-MCNC: 14.4 G/DL (ref 11.7–15.7)
HGB UR QL STRIP: NEGATIVE
KETONES UR STRIP-MCNC: NEGATIVE MG/DL
LDLC SERPL CALC-MCNC: 136 MG/DL
LEUKOCYTE ESTERASE UR QL STRIP: NEGATIVE
MCH RBC QN AUTO: 29.1 PG (ref 26.5–33)
MCHC RBC AUTO-ENTMCNC: 33 G/DL (ref 31.5–36.5)
MCV RBC AUTO: 88 FL (ref 78–100)
MUCOUS THREADS #/AREA URNS LPF: PRESENT /LPF
NITRATE UR QL: NEGATIVE
NONHDLC SERPL-MCNC: 177 MG/DL
PH UR STRIP: 5 PH (ref 5–7)
PLATELET # BLD AUTO: 231 10E9/L (ref 150–450)
POTASSIUM SERPL-SCNC: 3.9 MMOL/L (ref 3.5–5.1)
PROT SERPL-MCNC: 7.5 G/DL (ref 6.4–8.9)
RBC # BLD AUTO: 4.95 10E12/L (ref 3.8–5.2)
RBC #/AREA URNS AUTO: <1 /HPF (ref 0–2)
RHEUMATOID FACT SER NEPH-ACNC: <14 IU/ML (ref 0–20)
SODIUM SERPL-SCNC: 140 MMOL/L (ref 134–144)
SOURCE: ABNORMAL
SP GR UR STRIP: 1.02 (ref 1–1.03)
TRIGL SERPL-MCNC: 203 MG/DL
UROBILINOGEN UR STRIP-MCNC: NORMAL MG/DL (ref 0–2)
WBC # BLD AUTO: 4.6 10E9/L (ref 4–11)
WBC #/AREA URNS AUTO: 1 /HPF (ref 0–5)

## 2020-08-24 PROCEDURE — 36415 COLL VENOUS BLD VENIPUNCTURE: CPT | Mod: ZL | Performed by: PHYSICIAN ASSISTANT

## 2020-08-24 PROCEDURE — 87624 HPV HI-RISK TYP POOLED RSLT: CPT | Mod: ZL | Performed by: PHYSICIAN ASSISTANT

## 2020-08-24 PROCEDURE — 81001 URINALYSIS AUTO W/SCOPE: CPT | Mod: ZL | Performed by: PHYSICIAN ASSISTANT

## 2020-08-24 PROCEDURE — 86200 CCP ANTIBODY: CPT | Mod: ZL | Performed by: PHYSICIAN ASSISTANT

## 2020-08-24 PROCEDURE — 86431 RHEUMATOID FACTOR QUANT: CPT | Mod: ZL | Performed by: PHYSICIAN ASSISTANT

## 2020-08-24 PROCEDURE — 85027 COMPLETE CBC AUTOMATED: CPT | Mod: ZL | Performed by: PHYSICIAN ASSISTANT

## 2020-08-24 PROCEDURE — 73000 X-RAY EXAM OF COLLAR BONE: CPT | Mod: RT

## 2020-08-24 PROCEDURE — 99396 PREV VISIT EST AGE 40-64: CPT | Performed by: PHYSICIAN ASSISTANT

## 2020-08-24 PROCEDURE — 80053 COMPREHEN METABOLIC PANEL: CPT | Mod: ZL | Performed by: PHYSICIAN ASSISTANT

## 2020-08-24 PROCEDURE — 83036 HEMOGLOBIN GLYCOSYLATED A1C: CPT | Mod: ZL | Performed by: PHYSICIAN ASSISTANT

## 2020-08-24 PROCEDURE — G0123 SCREEN CERV/VAG THIN LAYER: HCPCS | Performed by: PHYSICIAN ASSISTANT

## 2020-08-24 PROCEDURE — 86038 ANTINUCLEAR ANTIBODIES: CPT | Mod: ZL | Performed by: PHYSICIAN ASSISTANT

## 2020-08-24 PROCEDURE — 80061 LIPID PANEL: CPT | Mod: ZL | Performed by: PHYSICIAN ASSISTANT

## 2020-08-24 PROCEDURE — 73564 X-RAY EXAM KNEE 4 OR MORE: CPT | Mod: 50

## 2020-08-24 RX ORDER — PREDNISONE 20 MG/1
20 TABLET ORAL 2 TIMES DAILY
Qty: 10 TABLET | Refills: 0 | Status: SHIPPED | OUTPATIENT
Start: 2020-08-24 | End: 2020-08-29

## 2020-08-24 RX ORDER — HYDROXYZINE HYDROCHLORIDE 25 MG/1
25 TABLET, FILM COATED ORAL 3 TIMES DAILY PRN
Qty: 15 TABLET | Refills: 2 | Status: SHIPPED | OUTPATIENT
Start: 2020-08-24 | End: 2021-02-16

## 2020-08-24 ASSESSMENT — MIFFLIN-ST. JEOR: SCORE: 1392.31

## 2020-08-24 NOTE — NURSING NOTE
Patient presents to clinic for annual physical.  Alicia Carrion LPN ....................  8/24/2020   10:31 AM

## 2020-08-25 ENCOUNTER — TELEPHONE (OUTPATIENT)
Dept: FAMILY MEDICINE | Facility: OTHER | Age: 65
End: 2020-08-25

## 2020-08-25 LAB
ANA SER QL IF: NEGATIVE
CCP AB SER IA-ACNC: 3 U/ML

## 2020-08-25 NOTE — TELEPHONE ENCOUNTER
Reason for call: Request for results.    Name of test or procedure: urine    Date of test or procedure: 4/24/20    Location of test or procedure: [facility]    Preferred method for responding to this message: Telephone Call    Phone number patient can be reached at: Cell number on file:    Telephone Information:   Mobile [phone]       If we can't reach you directly, may we leave a detailed response at the number you provided?Yes

## 2020-08-25 NOTE — TELEPHONE ENCOUNTER
I do not feel comfortable prescribing Percocet at for kidney stone related pain at this time. I do not see a previous prescription for this in her chart. If her pain continues and is out of control despite use of OTC pain relievers, I recommend she be seen in ED for consideration of pain management as she has been given injection and IV pain medications through them in the past.   Clementina Cam PA-C on 8/25/2020 at 1:20 PM

## 2020-08-25 NOTE — TELEPHONE ENCOUNTER
Notified patient of providers note.  Alicia Carrion LPN ....................  8/25/2020   1:36 PM

## 2020-08-25 NOTE — TELEPHONE ENCOUNTER
Patient was given the results and verbalized understanding. Patient states she had a Kidney stone that was passed yesterday and again today. Patient is wondering if she can get Percocet for the pain due to the kidney stones. Patient states she does use ice packs for pain as well.   Tere Barrera, LPN on 8/25/2020 at 1:15 PM

## 2020-08-27 ENCOUNTER — TELEPHONE (OUTPATIENT)
Dept: FAMILY MEDICINE | Facility: OTHER | Age: 65
End: 2020-08-27

## 2020-08-27 LAB
COPATH REPORT: NORMAL
PAP: NORMAL

## 2020-08-27 NOTE — TELEPHONE ENCOUNTER
Reason for call: Request for results.    Name of test or procedure: UA    Date of test or procedure: 8/24    Location of test or procedure: [facility]    Preferred method for responding to this message: Telephone Call    Phone number patient can be reached at: Cell number on file:    Telephone Information:   Mobile [phone]       If we can't reach you directly, may we leave a detailed response at the number you provided?Yes

## 2020-08-27 NOTE — TELEPHONE ENCOUNTER
I had previously been unaware of vaginal pain. If that is something new that needs to be evaluated she can schedule an appointment to see myself or another provider in clinic. I can place a referral to be evaluated for recurrent kidney stones if she would like. Please let me know.  Clementina Cam PA-C on 8/27/2020 at 12:36 PM

## 2020-08-27 NOTE — TELEPHONE ENCOUNTER
Notified patient of providers note and she states that she was busy and will call back to schedule appointment for vaginal pain.  Alicia Carrion LPN ....................  8/27/2020   1:04 PM

## 2020-08-27 NOTE — TELEPHONE ENCOUNTER
Please inform patient that her UA does not suggest UTI at this time. If she has persistent symptoms please follow up in clinic for further evaluation.  Clementina Cam PA-C on 8/27/2020 at 11:13 AM

## 2020-08-27 NOTE — TELEPHONE ENCOUNTER
Notified patient of providers note. She would like to see someone regarding passing kidney stones and vaginal pain. Please advise  Alicia Carrion LPN ....................  8/27/2020   11:47 AM

## 2020-09-01 LAB
FINAL DIAGNOSIS: NORMAL
HPV HR 12 DNA CVX QL NAA+PROBE: NEGATIVE
HPV16 DNA SPEC QL NAA+PROBE: NEGATIVE
HPV18 DNA SPEC QL NAA+PROBE: NEGATIVE
SPECIMEN DESCRIPTION: NORMAL
SPECIMEN SOURCE CVX/VAG CYTO: NORMAL

## 2020-09-14 ENCOUNTER — OFFICE VISIT (OUTPATIENT)
Dept: UROLOGY | Facility: OTHER | Age: 65
End: 2020-09-14
Attending: UROLOGY
Payer: COMMERCIAL

## 2020-09-14 VITALS
SYSTOLIC BLOOD PRESSURE: 132 MMHG | HEART RATE: 73 BPM | WEIGHT: 191 LBS | DIASTOLIC BLOOD PRESSURE: 64 MMHG | BODY MASS INDEX: 33.83 KG/M2 | RESPIRATION RATE: 16 BRPM

## 2020-09-14 DIAGNOSIS — Z87.442 HISTORY OF KIDNEY STONES: ICD-10-CM

## 2020-09-14 DIAGNOSIS — R10.9 RIGHT FLANK PAIN: Primary | ICD-10-CM

## 2020-09-14 PROCEDURE — 99204 OFFICE O/P NEW MOD 45 MIN: CPT | Performed by: UROLOGY

## 2020-09-14 ASSESSMENT — PAIN SCALES - GENERAL: PAINLEVEL: NO PAIN (0)

## 2020-09-14 NOTE — NURSING NOTE
Pt presents to clinic for kidney stone consult    Review of Systems:    Weight loss:    No     Recent fever/chills:  No   Night sweats:   No  Current skin rash:  No   Recent hair loss:  No  Heat intolerance:  No   Cold intolerance:  No  Chest pain:   No   Palpitations:   No  Shortness of breath:  No   Wheezing:   No  Constipation:    No   Diarrhea:   No   Nausea:   No   Vomiting:   No   Kidney/side pain:  No   Back pain:   No  Frequent headaches:  No   Dizziness:     No  Leg swelling:   No   Calf pain:    No    Parents, brothers or sisters with history of kidney cancer:   No  Parents, brothers or sisters with history of bladder cancer: No  Father or brother with history of prostate cancer:  No

## 2020-09-14 NOTE — PROGRESS NOTES
Type of Visit  NPV    Chief Complaint  Kidney stones  Right flank pain    HPI  Ms. Heart is a 65 year old female who presents with right flank pain for the last month.  The pain has been intermittent.  It lasts maybe 12 to 24 hours and then resolves for about a week.  It feels like renal colic she has experienced in the past.  It started in early August and continued through most of the month.  She denies fevers or chills or dysuria today.  Her last cross-sectional imaging was over 1-1/2 years ago.  He does have a history of recurrent stones.  She has started a concoction of all of oil and lemon juice.    She is highly motivated for stone prevention and has never undergone a work-up.      Past Medical History  She  has a past medical history of Allergic contact dermatitis due to plants, except food, Calculus of kidney, Enthesopathy, Menstrual migraine without status migrainosus, not intractable, and Other intervertebral disc displacement, lumbar region.  Patient Active Problem List   Diagnosis     Displacement of lumbar intervertebral disc without myelopathy     Other hyperlipidemia     Overweight     ACP (advance care planning)     History of kidney stones     Chronic right-sided low back pain, with sciatica presence unspecified     Past Surgical History  She  has a past surgical history that includes Arthroscopy knee (1996); Tonsillectomy; Laparoscopic tubal ligation; Mammoplasty reduction (1993); Colonoscopy (01/01/2010); Sling bladder suspension with fascia mario (2010); Toe Surgery; and 1ST MTP FUSION, 2ND HAMMERTOE REPAIR (Right, 10/29/2013).    Medications  She has a current medication list which includes the following prescription(s): hydroxyzine.    Allergies  Allergies   Allergen Reactions     Clindamycin Anaphylaxis     Tramadol Rash     Social History  She  reports that she has never smoked. She has never used smokeless tobacco. She reports current alcohol use of about 0.8 standard drinks of alcohol per  week. She reports that she does not use drugs.  No drug abuse.    Family History  Family History   Problem Relation Age of Onset     Hypertension Father      Kidney failure Father      Irritable Bowel Syndrome Sister      Hypertension Mother      Heart Disease Mother         Heart Disease, replaced heart valve     Arthritis Mother         Arthritis,Rheumatoid arthritis     Other - See Comments Sister         Acute intermittent porphyria, kidney transplant     Heart Disease Maternal Grandfather         Heart Disease, of diabetes     Breast Cancer Paternal Grandmother         Cancer-breast     Review of Systems  I personally reviewed the ROS with the patient.    Nursing Notes:   Kim Saba LPN  2020 11:24 AM  Sign at exiting of workspace  Pt presents to clinic for kidney stone consult    Review of Systems:    Weight loss:    No     Recent fever/chills:  No   Night sweats:   No  Current skin rash:  No   Recent hair loss:  No  Heat intolerance:  No   Cold intolerance:  No  Chest pain:   No   Palpitations:   No  Shortness of breath:  No   Wheezing:   No  Constipation:    No   Diarrhea:   No   Nausea:   No   Vomiting:   No   Kidney/side pain:  No   Back pain:   No  Frequent headaches:  No   Dizziness:     No  Leg swelling:   No   Calf pain:    No    Parents, brothers or sisters with history of kidney cancer:   No  Parents, brothers or sisters with history of bladder cancer: No  Father or brother with history of prostate cancer:  No      Physical Exam  Vitals:    20 1125   BP: 132/64   BP Location: Left arm   Patient Position: Sitting   Cuff Size: Adult Large   Pulse: 73   Resp: 16   Weight: 86.6 kg (191 lb)     Constitutional: NAD, WDWN  Head: NCAT  Eyes: Conjunctivae normal  Cardiovascular: Regular rate  Pulmonary/Chest: Respirations are even and non-labored bilaterally  Abdominal: Soft with no distension, tenderness, masses, guarding.    - left CVA tenderness    + right CVA  tenderness  Neurological: A + O x 3,  cranial nerves II-XII grossly intact  Extremities: KATIE x 4, warm, no clubbing, no cyanosis  Skin: Pink, warm, dry with no rash  Psychiatric:  Normal mood and affect  Genitourinary: Nonpalpable bladder    Labs  Results for TU HUGGINS (MRN [id number]) as of 9/14/2020 12:11   8/24/2020 11:54   Color Urine Yellow   Appearance Urine Cloudy   Glucose Urine Negative   Bilirubin Urine Negative   Ketones Urine Negative   Specific Gravity Urine 1.025   pH Urine 5.0   Protein Albumin Urine Negative   Urobilinogen mg/dL Normal   Nitrite Urine Negative   Blood Urine Negative   Leukocyte Esterase Urine Negative   Source Midstream Urine   WBC Urine 1   RBC Urine <1   Bacteria Urine Few (A)   Mucous Urine Present (A)     Imaging  I personally reviewed and interpreted the images and report.  CT a/p   10/11/2018  IMPRESSION:  No acute findings. Nonobstructive renal calculi.    Assessment  Ms. Huggins is a 65 year old female who is up with unexplained right flank pain consistent with previous renal colic episodes.  She is interested in imaging and given her last CT scan was a year and a half ago I agreed that is a reasonable approach.    We also discussed stone prevention at length.  We discussed a generic approach versus a tailored approach with [facility] 24-hour urine testing.  She would prefer the testing approach as she is highly motivated for prevention.     Plan  [facility]  Follow-up with CT stone study

## 2020-09-22 ENCOUNTER — HOSPITAL ENCOUNTER (OUTPATIENT)
Dept: MAMMOGRAPHY | Facility: OTHER | Age: 65
Discharge: HOME | End: 2020-09-22
Attending: PHYSICIAN ASSISTANT | Admitting: PHYSICIAN ASSISTANT
Payer: COMMERCIAL

## 2020-09-22 DIAGNOSIS — Z12.31 ENCOUNTER FOR SCREENING MAMMOGRAM FOR BREAST CANCER: ICD-10-CM

## 2020-09-22 PROCEDURE — 77067 SCR MAMMO BI INCL CAD: CPT

## 2020-11-16 ENCOUNTER — TELEPHONE (OUTPATIENT)
Dept: FAMILY MEDICINE | Facility: OTHER | Age: 65
End: 2020-11-16

## 2020-11-16 ENCOUNTER — OFFICE VISIT (OUTPATIENT)
Dept: UROLOGY | Facility: OTHER | Age: 65
End: 2020-11-16
Attending: UROLOGY
Payer: COMMERCIAL

## 2020-11-16 VITALS
BODY MASS INDEX: 35.14 KG/M2 | RESPIRATION RATE: 12 BRPM | HEART RATE: 80 BPM | SYSTOLIC BLOOD PRESSURE: 130 MMHG | WEIGHT: 198.4 LBS | DIASTOLIC BLOOD PRESSURE: 76 MMHG

## 2020-11-16 DIAGNOSIS — Z87.442 HISTORY OF KIDNEY STONES: Primary | ICD-10-CM

## 2020-11-16 PROCEDURE — 99214 OFFICE O/P EST MOD 30 MIN: CPT | Performed by: UROLOGY

## 2020-11-16 ASSESSMENT — PAIN SCALES - GENERAL: PAINLEVEL: NO PAIN (0)

## 2020-11-16 NOTE — NURSING NOTE
"Chief Complaint   Patient presents with     Follow Up     review [facility]     Review of Systems:    Weight loss:    No     Recent fever/chills:  No   Night sweats:   No  Current skin rash:  No   Recent hair loss:  No  Heat intolerance:  No   Cold intolerance:  No  Chest pain:   No   Palpitations:   No  Shortness of breath:  No   Wheezing:   No  Constipation:    No   Diarrhea:   No   Nausea:   No   Vomiting:   No   Kidney/side pain:  No   Back pain:   No  Frequent headaches:  No   Dizziness:     No  Leg swelling:   No   Calf pain:    No        Initial There were no vitals taken for this visit. Estimated body mass index is 33.83 kg/m  as calculated from the following:    Height as of 8/24/20: 1.6 m (5' 3\").    Weight as of 9/14/20: 86.6 kg (191 lb).  Medication Reconciliation: Completed     Tay Bull LPN  "

## 2020-11-16 NOTE — TELEPHONE ENCOUNTER
Patient is wanting to know if [facility] would be able to put in a order for a MRI for her knee. She states that she had [facility] examine it in August during her exam, but really started acting up. Please review and call back.     An appointment was offered, but denied.    Thanks!

## 2020-11-16 NOTE — NURSING NOTE
"Chief Complaint   Patient presents with     Follow Up     review [facility]     Review of Systems:    Weight loss:    No     Recent fever/chills:  No   Night sweats:   No  Current skin rash:  No   Recent hair loss:  No  Heat intolerance:  No   Cold intolerance:  No  Chest pain:   No   Palpitations:   No  Shortness of breath:  No   Wheezing:   No  Constipation:    No   Diarrhea:   No   Nausea:   No   Vomiting:   No   Kidney/side pain:  No   Back pain:   No  Frequent headaches:  No   Dizziness:     No  Leg swelling:   No   Calf pain:    No      Initial /76 (BP Location: Right arm, Patient Position: Sitting, Cuff Size: Adult Large)   Pulse 80   Resp 12   Wt 90 kg (198 lb 6.4 oz)   Breastfeeding No   BMI 35.14 kg/m   Estimated body mass index is 35.14 kg/m  as calculated from the following:    Height as of 8/24/20: 1.6 m (5' 3\").    Weight as of this encounter: 90 kg (198 lb 6.4 oz).  Medication Reconciliation: Completed     Tay Bull LPN  "

## 2020-11-16 NOTE — PROGRESS NOTES
Type of Visit  EST    Chief Complaint  Kidney stones  Right flank pain    HPI  Ms. Heart is a 65 year old female who follows up with a history of kidney stones.  At the last patient visit the patient had right flank pain.  This has resolved.  Plans for today included a CT scan prior to the visit but the patient refused.  She did collect a 24-hour urine [facility] and follows up to review the results of the testing.    When she collected the urine she recalls the first day reflected her typical fluid intake and diet.  The second day she drank a Marleny and overall had much lower fluid intake than a typical day.    She denies flank pain, dysuria and gross hematuria today.  She does not take any medications on a daily basis      Family History  Family History   Problem Relation Age of Onset     Hypertension Father      Kidney failure Father      Irritable Bowel Syndrome Sister      Hypertension Mother      Heart Disease Mother         Heart Disease, replaced heart valve     Arthritis Mother         Arthritis,Rheumatoid arthritis     Other - See Comments Sister         Acute intermittent porphyria, kidney transplant     Heart Disease Maternal Grandfather         Heart Disease, of diabetes     Breast Cancer Paternal Grandmother         Cancer-breast     Review of Systems  I personally reviewed the ROS with the patient.    Nursing Notes:   Tay Bull LPN  2020 11:51 AM  Signed  Chief Complaint   Patient presents with     Follow Up     review [facility]     Review of Systems:    Weight loss:    No     Recent fever/chills:  No   Night sweats:   No  Current skin rash:  No   Recent hair loss:  No  Heat intolerance:  No   Cold intolerance:  No  Chest pain:   No   Palpitations:   No  Shortness of breath:  No   Wheezing:   No  Constipation:    No   Diarrhea:   No   Nausea:   No   Vomiting:   No   Kidney/side pain:  No   Back pain:   No  Frequent headaches:  No   Dizziness:     No  Leg swelling:   No   Calf pain:  "   No      Initial /76 (BP Location: Right arm, Patient Position: Sitting, Cuff Size: Adult Large)   Pulse 80   Resp 12   Wt 90 kg (198 lb 6.4 oz)   Breastfeeding No   BMI 35.14 kg/m   Estimated body mass index is 35.14 kg/m  as calculated from the following:    Height as of 8/24/20: 1.6 m (5' 3\").    Weight as of this encounter: 90 kg (198 lb 6.4 oz).  Medication Reconciliation: Completed     Tay Bull LPN      Physical Exam  Vitals:    11/16/20 1138   BP: 130/76   BP Location: Right arm   Patient Position: Sitting   Cuff Size: Adult Large   Pulse: 80   Resp: 12   Weight: 90 kg (198 lb 6.4 oz)     Constitutional: NAD, WDWN  Head: NCAT  Eyes: Conjunctivae normal  Cardiovascular: Regular rate  Pulmonary/Chest: Respirations are even and non-labored bilaterally  Abdominal: Soft with no distension, tenderness, masses, guarding.    - left CVA tenderness    - right CVA tenderness  Neurological: A + O x 3,  cranial nerves II-XII grossly intact  Extremities: KATIE x 4, warm, no clubbing, no cyanosis  Skin: Pink, warm, dry with no rash  Psychiatric:  Normal mood and affect  Genitourinary: Nonpalpable bladder    Labs  Results for TU HUGGINS (MRN [id number]) as of 9/14/2020 12:11   8/24/2020 11:54   Color Urine Yellow   Appearance Urine Cloudy   Glucose Urine Negative   Bilirubin Urine Negative   Ketones Urine Negative   Specific Gravity Urine 1.025   pH Urine 5.0   Protein Albumin Urine Negative   Urobilinogen mg/dL Normal   Nitrite Urine Negative   Blood Urine Negative   Leukocyte Esterase Urine Negative   Source Midstream Urine   WBC Urine 1   RBC Urine <1   Bacteria Urine Few (A)   Mucous Urine Present (A)     [facility]  10/26/2020 & 10/27/2020  Volume (L)  1.7-2.6   SS CaOx  5.5-7.2  (6-10)  Urine Calcium  280-408 (female<200)  Urine Oxalate  29-30  (20-40)  Urine Citrate  720-758 (female>550)  SS CaP  1.4-3.1  (0.5-2)  24 hr Urine pH  6.1-6.4  (5.8-6.2)  SS Uric Acid  0.4-0.5  (0-1)  Urine uric " acid  0.8  (<0.8)    (collected while on treatment:  None)    Na: 102-229  Cl: 103-259    Imaging  I personally reviewed and interpreted the images and report.  CT a/p   10/11/2018  IMPRESSION:  No acute findings. Nonobstructive renal calculi.    Assessment & Plan  Ms. Heart is a 65 year old female who follows up after metabolic work-up for kidney stones.  I explained that her fluid intake and overall stone potential on the first day was much lower than the potential reflected on the second day.  This correlates well with her recall on diet and fluids.  Her overall fluid intake was lower in her salt intake was higher and this was reflected on the 24-hour urine results.  Along with the lower fluids and higher salt intake her relative risk of stone formation was much higher.    I recommended a low-salt diet and maintaining her increased fluid intake for the urine output goal of 2.5L/day.    I did explain that in the future if she were to ever take an antihypertensive medication it would be a good idea to consider chlorthalidone as a dual therapy antihypertensive and treatment for hypercalciuria.

## 2020-11-16 NOTE — Clinical Note
HOMERO: I explained that if she ever started an antihypertensive to consider chlorthalidone.  I told the patient she would need to bring this up with you in the future if she was ever diagnosed with hypertension.

## 2020-11-24 ENCOUNTER — VIRTUAL VISIT (OUTPATIENT)
Dept: FAMILY MEDICINE | Facility: OTHER | Age: 65
End: 2020-11-24
Attending: PHYSICIAN ASSISTANT
Payer: COMMERCIAL

## 2020-11-24 DIAGNOSIS — S89.91XA KNEE INJURY, RIGHT, INITIAL ENCOUNTER: Primary | ICD-10-CM

## 2020-11-24 PROCEDURE — 99213 OFFICE O/P EST LOW 20 MIN: CPT | Mod: 95 | Performed by: PHYSICIAN ASSISTANT

## 2020-11-24 NOTE — NURSING NOTE
Patient is having right knee pain after fall in October while hunting in [place]. Would like MRI ordered.  Alicia Carrion LPN ....................  11/24/2020   9:27 AM

## 2020-11-24 NOTE — TELEPHONE ENCOUNTER
We discussed concerns regarding both knees at that time and x-rays bilaterally as well. Which knee is she concerned with at this time?    Clementina Cam PA-C on 11/24/2020 at 8:45 AM

## 2020-11-24 NOTE — PROGRESS NOTES
"Debra Heart is a 65 year old female who is being evaluated via a billable telephone visit.      The patient has been notified of following:     \"This telephone visit will be conducted via a call between you and your physician/provider. We have found that certain health care needs can be provided without the need for a physical exam.  This service lets us provide the care you need with a short phone conversation.  If a prescription is necessary we can send it directly to your pharmacy.  If lab work is needed we can place an order for that and you can then stop by our lab to have the test done at a later time.    Telephone visits are billed at different rates depending on your insurance coverage. During this emergency period, for some insurers they may be billed the same as an in-person visit.  Please reach out to your insurance provider with any questions.    If during the course of the call the physician/provider feels a telephone visit is not appropriate, you will not be charged for this service.\"    Patient has given verbal consent for Telephone visit?  Yes    What phone number would you like to be contacted at? [phone]    How would you like to obtain your AVS? Mail a copy    Subjective     Debra Heart is a 65 year old female who presents via phone visit today for the following health issues:    HPI     Patient is contacted via telephone for discussion of right knee injury. Had been having troubles with her right knee for over a year and then in 10/2020 she fell when a dog ran in to her and now has the same pain but is more persistent. Pain is in anterior and posterior knee. Describes it mostly as a dull ache. Some mild swelling. Has been taking ibuprofen as needed for symptomatic relief. Icing it daily which does help some. Able to weight bear and ambulate. No popping or clicking, overlying skin changes, warmth to touch, numbness or tingling. She did have bilateral knee x-rays completed in 08/2020 " that showed mild tricompartmental arthritis.       PAST MEDICAL HISTORY:   Past Medical History:   Diagnosis Date     Allergic contact dermatitis due to plants, except food     Recurring poison ivy     Calculus of kidney     Kidney stones (calcium).     Enthesopathy     Right great toe bone spur.     Menstrual migraine without status migrainosus, not intractable     Menstrual migraine     Other intervertebral disc displacement, lumbar region     ,Herniated disc (lumbar) x's two.       PAST SURGICAL HISTORY:   Past Surgical History:   Procedure Laterality Date     1ST MTP FUSION, 2ND HAMMERTOE REPAIR Right 10/29/2013     ARTHROSCOPY KNEE  1996    Bilateral arthroscopy with lateral releases in  and .     COLONOSCOPY  2010,Due 2020     LAPAROSCOPIC TUBAL LIGATION      25     MAMMOPLASTY REDUCTION       SLING BLADDER SUSPENSION WITH FASCIA SHARA       TOE SURGERY      x2     TONSILLECTOMY      24       FAMILY HISTORY:   Family History   Problem Relation Age of Onset     Hypertension Father      Kidney failure Father      Irritable Bowel Syndrome Sister      Hypertension Mother      Heart Disease Mother         Heart Disease, replaced heart valve     Arthritis Mother         Arthritis,Rheumatoid arthritis     Other - See Comments Sister         Acute intermittent porphyria, kidney transplant     Heart Disease Maternal Grandfather         Heart Disease, of diabetes     Breast Cancer Paternal Grandmother         Cancer-breast       SOCIAL HISTORY:   Social History     Tobacco Use     Smoking status: Never Smoker     Smokeless tobacco: Never Used   Substance Use Topics     Alcohol use: Yes     Alcohol/week: 0.8 standard drinks     Comment: Alcoholic Drinks/day: occasionally/socially        Allergies   Allergen Reactions     Clindamycin Anaphylaxis     Tramadol Rash     Current Outpatient Medications   Medication     hydrOXYzine (ATARAX) 25 MG tablet     No current facility-administered  medications for this visit.          Review of Systems   Constitutional, HEENT, cardiovascular, pulmonary, gi and gu systems are negative, except as otherwise noted.       Objective          Vitals:  No vitals were obtained today due to virtual visit.    healthy, alert and no distress  PSYCH: Alert and oriented times 3; coherent speech, normal   rate and volume, able to articulate logical thoughts, able   to abstract reason, no tangential thoughts, no hallucinations   or delusions  Her affect is normal  RESP: No cough, no audible wheezing, able to talk in full sentences  Remainder of exam unable to be completed due to telephone visits    X-ray from 08/24/2020:  PROCEDURE:  XR KNEE STANDING 2V BILATERAL AND 2V BILATERAL     HISTORY: Chronic pain of both knees; Chronic pain of both knees;  Chronic pain of both knees; Knee injury, left, initial encounter.     COMPARISON:  None.     TECHNIQUE:  6 views of the knees.     FINDINGS:  No acute fracture is identified. There is mild  tricompartmental osteoarthritis of both knees, predominantly involving  the medial and patellofemoral compartments. No retained foreign body  is seen.                                                                       IMPRESSION: Mild tricompartmental osteoarthritis of both knees.       VIVIAN LOCKE MD            Assessment/Plan:  1. Knee injury, right, initial encounter      Given persistent knee pain after a fall and negative x-rays previously, will proceed forward with knee MRI at this time as she does not want to come for an office visit due to current COVID pandemic. Patient will schedule at her convenience. Encouraged conservative management in the meantime. Follow up as needed.     Phone call duration:  11 minutes    Clementina Cam PA-C on 11/24/2020 at 9:33 AM

## 2020-11-24 NOTE — TELEPHONE ENCOUNTER
October 16th she fell while hunting in [place]. Will make phone appointment.  Alicia Carrion LPN ....................  11/24/2020   9:23 AM

## 2020-11-24 NOTE — TELEPHONE ENCOUNTER
Would like minimum of phone visit to discuss this prior to MRI order placement as at her previous appointment her left knee was the large concern with a known injury and the right knee was minimally bothersome at that time.     Clementina Cam PA-C on 11/24/2020 at 8:52 AM

## 2020-11-27 ENCOUNTER — HOSPITAL ENCOUNTER (OUTPATIENT)
Dept: MRI IMAGING | Facility: OTHER | Age: 65
Discharge: HOME | End: 2020-11-27
Attending: PHYSICIAN ASSISTANT | Admitting: PHYSICIAN ASSISTANT
Payer: COMMERCIAL

## 2020-11-27 DIAGNOSIS — S89.91XA KNEE INJURY, RIGHT, INITIAL ENCOUNTER: ICD-10-CM

## 2020-11-27 PROCEDURE — 73721 MRI JNT OF LWR EXTRE W/O DYE: CPT | Mod: RT

## 2021-02-16 DIAGNOSIS — L30.9 DERMATITIS: Primary | ICD-10-CM

## 2021-02-16 DIAGNOSIS — L23.7 CONTACT DERMATITIS DUE TO POISON IVY: ICD-10-CM

## 2021-02-16 RX ORDER — PREDNISONE 20 MG/1
40 TABLET ORAL DAILY
Qty: 10 TABLET | Refills: 0 | Status: SHIPPED | OUTPATIENT
Start: 2021-02-16 | End: 2021-02-21

## 2021-02-16 RX ORDER — HYDROXYZINE HYDROCHLORIDE 25 MG/1
25 TABLET, FILM COATED ORAL 3 TIMES DAILY PRN
Qty: 15 TABLET | Refills: 2 | Status: SHIPPED | OUTPATIENT
Start: 2021-02-16 | End: 2021-02-25

## 2021-02-25 DIAGNOSIS — L30.9 DERMATITIS: Primary | ICD-10-CM

## 2021-02-25 DIAGNOSIS — L23.7 CONTACT DERMATITIS DUE TO POISON IVY: ICD-10-CM

## 2021-02-25 RX ORDER — PREDNISONE 20 MG/1
40 TABLET ORAL DAILY
Qty: 10 TABLET | Refills: 0 | Status: SHIPPED | OUTPATIENT
Start: 2021-02-25 | End: 2021-03-02

## 2021-02-25 RX ORDER — HYDROXYZINE HYDROCHLORIDE 25 MG/1
25 TABLET, FILM COATED ORAL 3 TIMES DAILY PRN
Qty: 15 TABLET | Refills: 2 | Status: SHIPPED | OUTPATIENT
Start: 2021-02-25 | End: 2021-03-09

## 2021-03-09 ENCOUNTER — OFFICE VISIT (OUTPATIENT)
Dept: FAMILY MEDICINE | Facility: OTHER | Age: 66
End: 2021-03-09
Attending: FAMILY MEDICINE
Payer: COMMERCIAL

## 2021-03-09 VITALS
SYSTOLIC BLOOD PRESSURE: 138 MMHG | HEART RATE: 82 BPM | BODY MASS INDEX: 33.36 KG/M2 | OXYGEN SATURATION: 96 % | RESPIRATION RATE: 16 BRPM | WEIGHT: 200.2 LBS | TEMPERATURE: 97.3 F | DIASTOLIC BLOOD PRESSURE: 84 MMHG | HEIGHT: 65 IN

## 2021-03-09 DIAGNOSIS — M17.11 ARTHRITIS OF RIGHT KNEE: Primary | ICD-10-CM

## 2021-03-09 DIAGNOSIS — M17.12 ARTHRITIS OF LEFT KNEE: ICD-10-CM

## 2021-03-09 PROBLEM — M51.26 DISPLACEMENT OF LUMBAR INTERVERTEBRAL DISC WITHOUT MYELOPATHY: Status: ACTIVE | Noted: 2021-03-09

## 2021-03-09 PROCEDURE — 99214 OFFICE O/P EST MOD 30 MIN: CPT | Performed by: FAMILY MEDICINE

## 2021-03-09 RX ORDER — HYDROXYZINE HYDROCHLORIDE 25 MG/1
25 TABLET, FILM COATED ORAL PRN
COMMUNITY
Start: 2021-02-25 | End: 2023-03-28

## 2021-03-09 RX ORDER — NAPROXEN 500 MG/1
500 TABLET ORAL 2 TIMES DAILY WITH MEALS
Qty: 60 TABLET | Refills: 1 | Status: SHIPPED | OUTPATIENT
Start: 2021-03-09 | End: 2021-08-30

## 2021-03-09 ASSESSMENT — PAIN SCALES - GENERAL: PAINLEVEL: MILD PAIN (3)

## 2021-03-09 ASSESSMENT — MIFFLIN-ST. JEOR: SCORE: 1460.23

## 2021-03-09 NOTE — NURSING NOTE
"Chief Complaint   Patient presents with     Knee Injury     right knee injury/pain, DOI: 10/16/20, injured while hunting and fell on knee     Patient presents to clinic today for right knee injury/pain. DOI: 10/16/20. Injured while hunting in [place] and got clipped by a dog and fell on her right knee. She states her knee fluctuates with pain and swelling.     Initial /84 (BP Location: Right arm, Patient Position: Sitting, Cuff Size: Adult Regular)   Pulse 82   Temp 97.3  F (36.3  C) (Tympanic)   Resp 16   Ht 1.645 m (5' 4.76\")   Wt 90.8 kg (200 lb 3.2 oz)   SpO2 96%   BMI 33.56 kg/m   Estimated body mass index is 33.56 kg/m  as calculated from the following:    Height as of this encounter: 1.645 m (5' 4.76\").    Weight as of this encounter: 90.8 kg (200 lb 3.2 oz).         Medication Reconciliation: Complete      Kelsey Cortez LPN   "

## 2021-03-09 NOTE — PROGRESS NOTES
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  Pain has been present since a fall in October 2020.  She had pain for several days which gradually improved with time.  She reports falling onto the anterior aspect of the knee.  Her pain has been off and on since that time.  She reports 3/10 pain today.  She has pain with multiple different movements and positions, hard to isolate 1 particular movement or activity that exacerbates her pain.  She has tried ice, rest, stretching, occasional over-the-counter medication, and some therapy exercises that were given to her by a local PT.  She will have some associated limping at times.    EXAM:  /84 (BP Location: Right arm, Patient Position: Sitting, Cuff Size: Adult Regular)   Pulse 82   Temp 97.3  F (36.3  C) (Tympanic)   Resp 16   Ht 1.645 m (5' 4.76\")   Wt 90.8 kg (200 lb 3.2 oz)   SpO2 96%   BMI 33.56 kg/m    MUSCULOSKELETAL EXAM:  RIGHT KNEE  Inspection:  -No gross deformity  -No bruising or soft tissue swelling  -Scars:  None    Tenderness to palpation of the:  -Quadriceps musculature:  Negative  -Quadriceps tendon:  Negative  -Patella:  Negative  -Medial patellar facet: Positive  -Lateral patellar facet: Positive  -Inferior pole of the patella:  Negative  -Patellar tendon:  Negative  -Tibial tuberosity:  Negative  -Medial joint line: Positive  -Medial collateral ligament:  Negative  -Medial hamstring tendons:  Negative  -Medial femoral condyle:  Negative  -Medial tibial plateau:  Negative  -Pes anserine bursa:  Negative  -Lateral joint line:  Negative  -Distal IT band:  Negative  -Gerdy's tubercle:  Negative  -Lateral collateral ligament:  Negative  -Lateral hamstring tendons:  Negative  -Lateral femoral condyle:  Negative  -Lateral tibial plateau:  Negative  -Posterior lateral corner:  Negative  -Popliteal fossa:  Negative    Range of Motion:  -Passive flexion:  120  -Passive extension:  " 0    Strength:  -Extension:  5/5  -Flexion:  5/5    Special Tests:  -Effusion:  Small  -Medial patellar glide: Positive  -Lateral patellar glide: Positive  -Patellar apprehension:  Negative  -Medial Boy's: Positive  -Lateral Boy's:  Negative  -Valgus stress:  Negative  -Varus stress:  Negative  -Lachman test:  Negative  -Anterior drawer:  Negative  -Posterior drawer:  Negative    Other:  -Intact sensation to light touch distally.  -No signs of cyanosis. Normal skin temperature of the lower extremity.  -Foot/ankle:  No gross deformity. Full range of motion.  -Left knee:  No gross deformity. No palpable tenderness. Normal strength and ROM.      IMAGIN2020: 6 view bilateral knee x-ray  -Bilateral medial tibiofemoral compartment joint space narrowing, worse on the right.  Moderate-severe patellofemoral joint space narrowing on the left, mild on the right.  Shallow trochlear grooves bilaterally.  No fracture, dislocation, or bony lesion.    2020: MRI right knee  -Baker's cyst is noted.  Mild degenerative changes of the lateral meniscus.  Intact MCL, LCL, ACL, and PCL.    ASSESSMENT/PLAN:  Diagnoses and all orders for this visit:  Arthritis of right knee  -     naproxen (NAPROSYN) 500 MG tablet; Take 1 tablet (500 mg) by mouth 2 times daily (with meals) x5-7 days then prn thereafter  Arthritis of left knee  -     naproxen (NAPROSYN) 500 MG tablet; Take 1 tablet (500 mg) by mouth 2 times daily (with meals) x5-7 days then prn thereafter    65-year-old female with bilateral osteoarthritis with symptoms on the right side.  6 view bilateral knee x-ray from  and MRI of the right knee from  were both personally reviewed in the office with the findings as demonstrated above by my interpretation.  We discussed the treatment options for knee osteoarthritis to include aerobic exercise, physical therapy, weight loss, over-the-counter medications, prescription medications, injection therapy, or  surgical referral.  Patient is interested in proceeding with prescription anti-inflammatories and viscosupplementation injections.  -Discussed the importance of weight loss, patient has a goal to lose 20-30 pounds  -Encouraged continued aerobic exercise  -Continue with rehabilitation/therapy exercises  -Naproxen 500 mg twice daily as needed x5-7 days then as needed thereafter  -We will obtain authorization to move forward with ultrasound-guided viscosupplementation injection for the right knee  -Follow-up in the office for injection once we have approval    Yobani Tao MD  3/9/2021  1:17 PM    Total time spent with this patient was 44 minutes which included chart review, visualization and interpretation of images, time spent with the patient, and documentation.    This document was prepared using voice recognition software technology.  While every attempt was made to be completely accurate, grammatical errors may still exist.  If you have questions interpreting this note or about the content within it, please reach out to this documenting provider.

## 2021-03-09 NOTE — Clinical Note
Blanco Mcrae,    I saw your patient in the office today for right knee arthritis.  I have prescribed NSAIDs and she will see me back in the office for a viscosupplementation injection.  Please let me know if you have questions.  Thanks.    Yobani

## 2021-05-13 ENCOUNTER — PATIENT OUTREACH (OUTPATIENT)
Dept: FAMILY MEDICINE | Facility: OTHER | Age: 66
End: 2021-05-13

## 2021-05-13 NOTE — LETTER
[facility]  [address]  [place] [id number] [phone]       May 13, 2021    Debra Heart  [address] [place] [address]    Dear Debra,    We care about your health and have reviewed your health plan and are making recommendations based on this review, to optimize your health.    You are in particular need of attention regarding:  -Colon Cancer Screening    We are recommending that you:  -schedule a COLONOSCOPY to look for colon cancer (due every 10 years or 5 years in higher risk situations.)        Colon cancer is now the second leading cause of cancer-related deaths in the [facility] for both men and women and there are over 130,000 new cases and 50,000 deaths per year from colon cancer.  Colonoscopies can prevent 90-95% of these deaths.  Problem lesions can be removed before they ever become cancer.  This test is not only looking for cancer, but also getting rid of precancerious lesions.    If you are under/uninsured, we recommend you contact the [employer] program. [employer] is a free colorectal cancer screening program that provides colonoscopies for eligible under/uninsured [place] men and women. If you are interested in receiving a free colonoscopy, please call [employer] at [phone] (mention code [employer]) to see if you re eligible.      If you do not wish to do a colonoscopy or cannot afford to do one, at this time, there is another option. It is called a FIT test or Fecal Immunochemical Occult Blood Test (take home stool sample kit).  It does not replace the colonoscopy for colorectal cancer screening, but it can detect hidden bleeding in the lower colon.  It does need to be repeated every year and if a positive result is obtained, you would be referred for a colonoscopy.          If you have completed either one of these tests at another facility, please call with the details of when and where the tests were done and if they were  normal or not. Or have the records sent to our clinic so that we can best coordinate your care.      In addition, here is a list of due or overdue Health Maintenance reminders.    Health Maintenance Due   Topic Date Due     Preventive Care Visit  Never done     HIV Screening  Never done     COVID-19 Vaccine (1) Never done     Hepatitis C Screening  Never done     Diptheria Tetanus Pertussis (DTAP/TDAP/TD) Vaccine (1 - Tdap) 06/22/1982     Zoster (Shingles) Vaccine (1 of 2) Never done     Colorectal Cancer Screening  01/01/2020       To address the above recommendations, we encourage you to contact us at [phone], via [employer] or by contacting Central Scheduling toll free at [phone] 24 hours a day. They will assist you with finding the most convenient time and location.    Thank you for trusting [facility]\"" and we appreciate the opportunity to serve you.  We look forward to supporting your healthcare needs in the future.    Healthy Regards,    Your [facility] Team

## 2021-05-13 NOTE — TELEPHONE ENCOUNTER
Patient Quality Outreach      Summary:    Patient has the following on her problem list/HM: None    Patient is due/failing the following:   Colonoscopy    Type of outreach:    Sent letter.    Questions for provider review:    None                                                                                                                                          Chart routed to Care Team.

## 2021-06-03 ENCOUNTER — OFFICE VISIT (OUTPATIENT)
Dept: FAMILY MEDICINE | Facility: OTHER | Age: 66
End: 2021-06-03
Attending: PHYSICIAN ASSISTANT
Payer: COMMERCIAL

## 2021-06-03 VITALS
WEIGHT: 199.6 LBS | DIASTOLIC BLOOD PRESSURE: 74 MMHG | BODY MASS INDEX: 33.46 KG/M2 | TEMPERATURE: 97.2 F | RESPIRATION RATE: 18 BRPM | HEART RATE: 64 BPM | OXYGEN SATURATION: 95 % | SYSTOLIC BLOOD PRESSURE: 116 MMHG

## 2021-06-03 DIAGNOSIS — N39.9 URINARY PROBLEM IN FEMALE: ICD-10-CM

## 2021-06-03 DIAGNOSIS — R39.15 URINARY URGENCY: Primary | ICD-10-CM

## 2021-06-03 LAB
ALBUMIN UR-MCNC: NEGATIVE MG/DL
APPEARANCE UR: CLEAR
BILIRUB UR QL STRIP: NEGATIVE
COLOR UR AUTO: YELLOW
GLUCOSE UR STRIP-MCNC: NEGATIVE MG/DL
HGB UR QL STRIP: ABNORMAL
KETONES UR STRIP-MCNC: NEGATIVE MG/DL
LEUKOCYTE ESTERASE UR QL STRIP: NEGATIVE
MUCOUS THREADS #/AREA URNS LPF: PRESENT /LPF
NITRATE UR QL: NEGATIVE
PH UR STRIP: 6 PH (ref 5–7)
RBC #/AREA URNS AUTO: <1 /HPF (ref 0–2)
SOURCE: ABNORMAL
SP GR UR STRIP: 1 (ref 1–1.03)
UROBILINOGEN UR STRIP-MCNC: NORMAL MG/DL (ref 0–2)
WBC #/AREA URNS AUTO: 0 /HPF (ref 0–5)

## 2021-06-03 PROCEDURE — 99213 OFFICE O/P EST LOW 20 MIN: CPT | Performed by: PHYSICIAN ASSISTANT

## 2021-06-03 PROCEDURE — 81001 URINALYSIS AUTO W/SCOPE: CPT | Mod: ZL | Performed by: PHYSICIAN ASSISTANT

## 2021-06-03 PROCEDURE — 87086 URINE CULTURE/COLONY COUNT: CPT | Mod: ZL | Performed by: PHYSICIAN ASSISTANT

## 2021-06-03 ASSESSMENT — PAIN SCALES - GENERAL: PAINLEVEL: NO PAIN (0)

## 2021-06-03 NOTE — NURSING NOTE
"Chief Complaint   Patient presents with     Urinary Problem     She has been having urinary pressure since last night.     Initial There were no vitals taken for this visit. Estimated body mass index is 33.56 kg/m  as calculated from the following:    Height as of 3/9/21: 1.645 m (5' 4.76\").    Weight as of 3/9/21: 90.8 kg (200 lb 3.2 oz).         Medication Reconciliation: Complete      Mina Saba LPN   "

## 2021-06-03 NOTE — PROGRESS NOTES
"ASSESSMENT/PLAN:    I have reviewed the nursing notes.  I have reviewed the findings, diagnosis, plan and need for follow up with the patient.    (R39.15) Urinary urgency  (primary encounter diagnosis)  (N39.9) Urinary problem in female  Comment: see below  Plan: UA reflex to Microscopic and Culture, Urine Culture Aerobic Bacterial        Vital signs stable. PE consistent with probable renal stone, patient declined further workup, including imaging. She states she feels better using her mixture of lemon juice and olive oil, she would like to continue with this. She has normal urinary function, afebrile and does not appear in pain, I am comfortable sending her home, however, we discussed warning signs to prompt urgent follow up such as flank/back pain, fevers, chills, etc. - she is agreeable to return if any of these occur. I did order a urine culture to be safe, did discuss with patient that if her culture returns with growth that warrants an antibiotic that she will be informed of this, she would like to use [facility] as her pharmacy if this instance occurs. May use over-the-counter Tylenol or ibuprofen PRN. Patient is in agreement and understanding of the above treatment plan. All questions and concerns were addressed and answered to patient's satisfaction. AVS reviewed with patient.     Discussed warning signs/symptoms indicative of need to f/u    Follow up if symptoms persist or worsen or concerns    I explained my diagnostic considerations and recommendations to the patient, who voiced understanding and agreement with the treatment plan. All questions were answered. We discussed potential side effects of any prescribed or recommended therapies, as well as expectations for response to treatments.    Valerie Pruitt PA-C  6/3/2021  11:18 AM    HPI:    Debra Heart is a 65 year old female  who presents to [facility] today for concerns of bladder infection, x last night onset.    Symptoms: \"felt funny down " "there\", but notes last night symptoms were worse. Urinary pressure and hesitancy. She felt she got a kidney stone, she feels it went away (took a shot glass of lemon juice and virgin olive oil) - she feels another one currently  Flank Pain or Back Pain: Yes (felt like she has had two stones since onset of symptoms)  Blood in Urine: No  [facility]  Able to Completely Urinate/Void: No  Prior UTIs: Yes  Exposures to STIs/STDs: No  Fevers, chills, N/V/D: No  Change in Bowel Habits: No  Vaginal Symptoms or Discharge: No  Recent swimming/use of hot tubs/swimming pools/lakes: No    LMP: post menopausal    Treatments tried: lemon juice and olive oil    Denies CP, SOB, calf tenderness. No new medications. Denies exposure to ill or COVID positive patients.     Allergies: tramadol and clindamycin     PCP: DEONDRE Cam    Past Medical History:   Diagnosis Date     Allergic contact dermatitis due to plants, except food     Recurring poison ivy     Calculus of kidney     Kidney stones (calcium).     Enthesopathy     Right great toe bone spur.     Menstrual migraine without status migrainosus, not intractable     Menstrual migraine     Other intervertebral disc displacement, lumbar region     1999,Herniated disc (lumbar) x's two.     Past Surgical History:   Procedure Laterality Date     1ST MTP FUSION, 2ND HAMMERTOE REPAIR Right 10/29/2013     ARTHROSCOPY KNEE  1996    Bilateral arthroscopy with lateral releases in 1996 and 1997.     COLONOSCOPY  01/01/2010 2010,Due 2020     LAPAROSCOPIC TUBAL LIGATION      25     MAMMOPLASTY REDUCTION  1993     SLING BLADDER SUSPENSION WITH FASCIA SHARA  2010     TOE SURGERY      x2     TONSILLECTOMY      24     Social History     Tobacco Use     Smoking status: Never Smoker     Smokeless tobacco: Never Used   Substance Use Topics     Alcohol use: Yes     Alcohol/week: 0.8 standard drinks     Comment: Alcoholic Drinks/day: occasionally/socially     Current Outpatient " Medications   Medication Sig Dispense Refill     naproxen (NAPROSYN) 500 MG tablet Take 1 tablet (500 mg) by mouth 2 times daily (with meals) x5-7 days then prn thereafter 60 tablet 1     hydrOXYzine (ATARAX) 25 MG tablet Take 25 mg by mouth as needed       Allergies   Allergen Reactions     Clindamycin Anaphylaxis     Tramadol Rash     Past medical history, past surgical history, current medications and allergies reviewed and accurate to the best of my knowledge.      ROS:  Refer to HPI    /74   Pulse 64   Temp 97.2  F (36.2  C) (Tympanic)   Resp 18   Wt 90.5 kg (199 lb 9.6 oz)   SpO2 95%   BMI 33.46 kg/m       EXAM:  General Appearance: Well appearing 65-year old female, appropriate appearance for age. No acute distress  Respiratory: normal chest wall and respirations.  Normal effort.  Clear to auscultation bilaterally, no wheezing, crackles or rhonchi.  No increased work of breathing.  No cough appreciated.  Cardiac: RRR with no murmurs  Abdomen: soft, nontender, no rigidity, no rebound tenderness or guarding, normal bowel sounds present  :  No suprapubic tenderness to palpation.  No CVA tenderness to palpation.    Musculoskeletal:  Equal movement of bilateral upper extremities.  Equal movement of bilateral lower extremities.  Normal gait.    Dermatological: no rashes noted of exposed skin  Psychological: normal affect, alert, oriented, and pleasant.     Labs:  UA: moderate blood. No bacteria, WBC, RBC, nitrites or LE    Xray:  None

## 2021-06-05 LAB
BACTERIA SPEC CULT: NO GROWTH
SPECIMEN SOURCE: NORMAL

## 2021-06-23 ENCOUNTER — IMMUNIZATION (OUTPATIENT)
Dept: FAMILY MEDICINE | Facility: OTHER | Age: 66
End: 2021-06-23
Attending: FAMILY MEDICINE
Payer: COMMERCIAL

## 2021-06-23 PROCEDURE — 91300 PR COVID VAC PFIZER DIL RECON 30 MCG/0.3 ML IM: CPT

## 2021-06-23 PROCEDURE — 0001A PR COVID VAC PFIZER DIL RECON 30 MCG/0.3 ML IM: CPT

## 2021-07-14 ENCOUNTER — IMMUNIZATION (OUTPATIENT)
Dept: FAMILY MEDICINE | Facility: OTHER | Age: 66
End: 2021-07-14
Attending: FAMILY MEDICINE
Payer: COMMERCIAL

## 2021-07-14 PROCEDURE — 0002A PR COVID VAC PFIZER DIL RECON 30 MCG/0.3 ML IM: CPT

## 2021-07-14 PROCEDURE — 91300 PR COVID VAC PFIZER DIL RECON 30 MCG/0.3 ML IM: CPT

## 2022-01-03 DIAGNOSIS — Z87.898 H/O MOTION SICKNESS: Primary | ICD-10-CM

## 2022-01-03 RX ORDER — SCOLOPAMINE TRANSDERMAL SYSTEM 1 MG/1
1 PATCH, EXTENDED RELEASE TRANSDERMAL
Qty: 10 PATCH | Refills: 1 | Status: SHIPPED | OUTPATIENT
Start: 2022-01-03 | End: 2023-03-28

## 2022-01-29 ENCOUNTER — HEALTH MAINTENANCE LETTER (OUTPATIENT)
Age: 67
End: 2022-01-29

## 2022-02-17 PROBLEM — M51.26 DISPLACEMENT OF LUMBAR INTERVERTEBRAL DISC WITHOUT MYELOPATHY: Status: RESOLVED | Noted: 2018-02-01 | Resolved: 2021-03-09

## 2022-03-07 ENCOUNTER — IMMUNIZATION (OUTPATIENT)
Dept: FAMILY MEDICINE | Facility: OTHER | Age: 67
End: 2022-03-07
Attending: PHYSICIAN ASSISTANT
Payer: COMMERCIAL

## 2022-03-07 PROCEDURE — 91305 COVID-19,PF,PFIZER (12+ YRS): CPT

## 2022-03-07 PROCEDURE — 0054A COVID-19,PF,PFIZER (12+ YRS): CPT

## 2022-09-07 ENCOUNTER — LAB (OUTPATIENT)
Dept: LAB | Facility: OTHER | Age: 67
End: 2022-09-07
Payer: MEDICARE

## 2022-09-07 DIAGNOSIS — R30.0 DYSURIA: ICD-10-CM

## 2022-09-07 DIAGNOSIS — R30.0 DYSURIA: Primary | ICD-10-CM

## 2022-09-07 LAB
ALBUMIN UR-MCNC: NEGATIVE MG/DL
APPEARANCE UR: CLEAR
BILIRUB UR QL STRIP: NEGATIVE
COLOR UR AUTO: ABNORMAL
GLUCOSE UR STRIP-MCNC: NEGATIVE MG/DL
HGB UR QL STRIP: NEGATIVE
KETONES UR STRIP-MCNC: NEGATIVE MG/DL
LEUKOCYTE ESTERASE UR QL STRIP: ABNORMAL
MUCOUS THREADS #/AREA URNS LPF: PRESENT /LPF
NITRATE UR QL: NEGATIVE
PH UR STRIP: 5.5 [PH] (ref 5–9)
RBC URINE: <1 /HPF
SP GR UR STRIP: 1.02 (ref 1–1.03)
UROBILINOGEN UR STRIP-MCNC: NORMAL MG/DL
WBC URINE: 2 /HPF

## 2022-09-07 PROCEDURE — 81001 URINALYSIS AUTO W/SCOPE: CPT | Mod: ZL

## 2022-09-07 PROCEDURE — 87086 URINE CULTURE/COLONY COUNT: CPT | Mod: ZL

## 2022-09-07 RX ORDER — SULFAMETHOXAZOLE/TRIMETHOPRIM 800-160 MG
1 TABLET ORAL 2 TIMES DAILY
Qty: 10 TABLET | Refills: 0 | Status: SHIPPED | OUTPATIENT
Start: 2022-09-07 | End: 2022-09-12

## 2022-09-09 LAB — BACTERIA UR CULT: NO GROWTH

## 2022-09-17 ENCOUNTER — HEALTH MAINTENANCE LETTER (OUTPATIENT)
Age: 67
End: 2022-09-17

## 2022-12-20 ENCOUNTER — LAB (OUTPATIENT)
Dept: LAB | Facility: OTHER | Age: 67
End: 2022-12-20
Attending: INTERNAL MEDICINE
Payer: MEDICARE

## 2022-12-20 DIAGNOSIS — R30.0 DYSURIA: Primary | ICD-10-CM

## 2022-12-20 DIAGNOSIS — R30.0 DYSURIA: ICD-10-CM

## 2022-12-20 LAB
ALBUMIN UR-MCNC: NEGATIVE MG/DL
APPEARANCE UR: CLEAR
BILIRUB UR QL STRIP: NEGATIVE
COLOR UR AUTO: YELLOW
GLUCOSE UR STRIP-MCNC: NEGATIVE MG/DL
HGB UR QL STRIP: ABNORMAL
KETONES UR STRIP-MCNC: NEGATIVE MG/DL
LEUKOCYTE ESTERASE UR QL STRIP: ABNORMAL
MUCOUS THREADS #/AREA URNS LPF: PRESENT /LPF
NITRATE UR QL: NEGATIVE
PH UR STRIP: 6.5 [PH] (ref 5–9)
RBC URINE: 4 /HPF
SP GR UR STRIP: 1.01 (ref 1–1.03)
UROBILINOGEN UR STRIP-MCNC: NORMAL MG/DL
WBC URINE: 1 /HPF

## 2022-12-20 PROCEDURE — 81001 URINALYSIS AUTO W/SCOPE: CPT | Mod: ZL

## 2022-12-20 RX ORDER — SULFAMETHOXAZOLE/TRIMETHOPRIM 800-160 MG
1 TABLET ORAL 2 TIMES DAILY
Qty: 10 TABLET | Refills: 0 | Status: SHIPPED | OUTPATIENT
Start: 2022-12-20 | End: 2022-12-25

## 2023-01-28 ENCOUNTER — HEALTH MAINTENANCE LETTER (OUTPATIENT)
Age: 68
End: 2023-01-28

## 2023-02-03 ENCOUNTER — TELEPHONE (OUTPATIENT)
Dept: FAMILY MEDICINE | Facility: OTHER | Age: 68
End: 2023-02-03
Payer: MEDICARE

## 2023-02-03 NOTE — TELEPHONE ENCOUNTER
Patient is going to the dentist this am and needs to know her allergy list.       Please call back   Thank you   Chika Angel on 2/3/2023 at 8:03 AM

## 2023-02-03 NOTE — TELEPHONE ENCOUNTER
Notified patient of allergies on her list.  Alicia Carrion LPN ....................  2/3/2023   8:12 AM

## 2023-03-27 ASSESSMENT — ENCOUNTER SYMPTOMS
CHILLS: 0
HEMATOCHEZIA: 0
BREAST MASS: 0
EYE PAIN: 0
PALPITATIONS: 0
NERVOUS/ANXIOUS: 0
ARTHRALGIAS: 0
SORE THROAT: 0
COUGH: 0
HEADACHES: 0
WEAKNESS: 0
MYALGIAS: 0
PARESTHESIAS: 0
FREQUENCY: 0
SHORTNESS OF BREATH: 0
DYSURIA: 0
DIARRHEA: 0
ABDOMINAL PAIN: 0
DIZZINESS: 0
CONSTIPATION: 0
NAUSEA: 0
HEMATURIA: 0
HEARTBURN: 0
JOINT SWELLING: 0

## 2023-03-27 ASSESSMENT — ACTIVITIES OF DAILY LIVING (ADL): CURRENT_FUNCTION: NO ASSISTANCE NEEDED

## 2023-03-28 ENCOUNTER — OFFICE VISIT (OUTPATIENT)
Dept: FAMILY MEDICINE | Facility: OTHER | Age: 68
End: 2023-03-28
Attending: PHYSICIAN ASSISTANT
Payer: MEDICARE

## 2023-03-28 VITALS
RESPIRATION RATE: 12 BRPM | OXYGEN SATURATION: 96 % | DIASTOLIC BLOOD PRESSURE: 76 MMHG | WEIGHT: 193.6 LBS | HEART RATE: 82 BPM | TEMPERATURE: 96.5 F | HEIGHT: 64 IN | BODY MASS INDEX: 33.05 KG/M2 | SYSTOLIC BLOOD PRESSURE: 124 MMHG

## 2023-03-28 DIAGNOSIS — Z12.31 ENCOUNTER FOR SCREENING MAMMOGRAM FOR BREAST CANCER: ICD-10-CM

## 2023-03-28 DIAGNOSIS — Z13.220 LIPID SCREENING: ICD-10-CM

## 2023-03-28 DIAGNOSIS — Z00.00 INITIAL MEDICARE ANNUAL WELLNESS VISIT: Primary | ICD-10-CM

## 2023-03-28 DIAGNOSIS — Z78.0 POST-MENOPAUSAL: ICD-10-CM

## 2023-03-28 DIAGNOSIS — R30.0 DYSURIA: ICD-10-CM

## 2023-03-28 DIAGNOSIS — Z13.820 SCREENING FOR OSTEOPOROSIS: ICD-10-CM

## 2023-03-28 DIAGNOSIS — Z12.11 SPECIAL SCREENING FOR MALIGNANT NEOPLASMS, COLON: ICD-10-CM

## 2023-03-28 DIAGNOSIS — Z23 NEED FOR PNEUMOCOCCAL VACCINATION: ICD-10-CM

## 2023-03-28 LAB
ALBUMIN UR-MCNC: NEGATIVE MG/DL
ANION GAP SERPL CALCULATED.3IONS-SCNC: 10 MMOL/L (ref 7–15)
APPEARANCE UR: CLEAR
BASOPHILS # BLD AUTO: 0.1 10E3/UL (ref 0–0.2)
BASOPHILS NFR BLD AUTO: 1 %
BILIRUB UR QL STRIP: NEGATIVE
BUN SERPL-MCNC: 14.3 MG/DL (ref 8–23)
CALCIUM SERPL-MCNC: 9.3 MG/DL (ref 8.8–10.2)
CHLORIDE SERPL-SCNC: 101 MMOL/L (ref 98–107)
CHOLEST SERPL-MCNC: 237 MG/DL
COLOR UR AUTO: ABNORMAL
CREAT SERPL-MCNC: 0.65 MG/DL (ref 0.51–0.95)
DEPRECATED HCO3 PLAS-SCNC: 28 MMOL/L (ref 22–29)
EOSINOPHIL # BLD AUTO: 0.1 10E3/UL (ref 0–0.7)
EOSINOPHIL NFR BLD AUTO: 3 %
ERYTHROCYTE [DISTWIDTH] IN BLOOD BY AUTOMATED COUNT: 12.4 % (ref 10–15)
GFR SERPL CREATININE-BSD FRML MDRD: >90 ML/MIN/1.73M2
GLUCOSE SERPL-MCNC: 107 MG/DL (ref 70–99)
GLUCOSE UR STRIP-MCNC: NEGATIVE MG/DL
HCT VFR BLD AUTO: 42.2 % (ref 35–47)
HDLC SERPL-MCNC: 46 MG/DL
HGB BLD-MCNC: 14.3 G/DL (ref 11.7–15.7)
HGB UR QL STRIP: NEGATIVE
HOLD SPECIMEN: NORMAL
IMM GRANULOCYTES # BLD: 0 10E3/UL
IMM GRANULOCYTES NFR BLD: 0 %
KETONES UR STRIP-MCNC: NEGATIVE MG/DL
LDLC SERPL CALC-MCNC: 143 MG/DL
LEUKOCYTE ESTERASE UR QL STRIP: ABNORMAL
LYMPHOCYTES # BLD AUTO: 1.5 10E3/UL (ref 0.8–5.3)
LYMPHOCYTES NFR BLD AUTO: 39 %
MCH RBC QN AUTO: 29.5 PG (ref 26.5–33)
MCHC RBC AUTO-ENTMCNC: 33.9 G/DL (ref 31.5–36.5)
MCV RBC AUTO: 87 FL (ref 78–100)
MONOCYTES # BLD AUTO: 0.3 10E3/UL (ref 0–1.3)
MONOCYTES NFR BLD AUTO: 9 %
MUCOUS THREADS #/AREA URNS LPF: PRESENT /LPF
NEUTROPHILS # BLD AUTO: 1.8 10E3/UL (ref 1.6–8.3)
NEUTROPHILS NFR BLD AUTO: 48 %
NITRATE UR QL: NEGATIVE
NONHDLC SERPL-MCNC: 191 MG/DL
NRBC # BLD AUTO: 0 10E3/UL
NRBC BLD AUTO-RTO: 0 /100
PH UR STRIP: 5.5 [PH] (ref 5–9)
PLATELET # BLD AUTO: 216 10E3/UL (ref 150–450)
POTASSIUM SERPL-SCNC: 4 MMOL/L (ref 3.4–5.3)
RBC # BLD AUTO: 4.85 10E6/UL (ref 3.8–5.2)
RBC URINE: 1 /HPF
SODIUM SERPL-SCNC: 139 MMOL/L (ref 136–145)
SP GR UR STRIP: 1.02 (ref 1–1.03)
TRIGL SERPL-MCNC: 239 MG/DL
UROBILINOGEN UR STRIP-MCNC: NORMAL MG/DL
WBC # BLD AUTO: 3.8 10E3/UL (ref 4–11)
WBC URINE: 4 /HPF

## 2023-03-28 PROCEDURE — 81001 URINALYSIS AUTO W/SCOPE: CPT | Mod: ZL | Performed by: PHYSICIAN ASSISTANT

## 2023-03-28 PROCEDURE — 87086 URINE CULTURE/COLONY COUNT: CPT | Mod: ZL | Performed by: PHYSICIAN ASSISTANT

## 2023-03-28 PROCEDURE — G0402 INITIAL PREVENTIVE EXAM: HCPCS | Performed by: PHYSICIAN ASSISTANT

## 2023-03-28 PROCEDURE — 85025 COMPLETE CBC W/AUTO DIFF WBC: CPT | Mod: ZL | Performed by: PHYSICIAN ASSISTANT

## 2023-03-28 PROCEDURE — 36415 COLL VENOUS BLD VENIPUNCTURE: CPT | Mod: ZL | Performed by: PHYSICIAN ASSISTANT

## 2023-03-28 PROCEDURE — 80061 LIPID PANEL: CPT | Mod: ZL | Performed by: PHYSICIAN ASSISTANT

## 2023-03-28 PROCEDURE — 80048 BASIC METABOLIC PNL TOTAL CA: CPT | Mod: ZL | Performed by: PHYSICIAN ASSISTANT

## 2023-03-28 PROCEDURE — 90677 PCV20 VACCINE IM: CPT

## 2023-03-28 ASSESSMENT — ENCOUNTER SYMPTOMS
DYSURIA: 0
NERVOUS/ANXIOUS: 0
DIARRHEA: 0
DIZZINESS: 0
BREAST MASS: 0
ARTHRALGIAS: 0
MYALGIAS: 0
SHORTNESS OF BREATH: 0
NAUSEA: 0
CHILLS: 0
HEARTBURN: 0
JOINT SWELLING: 0
COUGH: 0
ABDOMINAL PAIN: 0
HEMATOCHEZIA: 0
PALPITATIONS: 0
SORE THROAT: 0
HEMATURIA: 0
EYE PAIN: 0
PARESTHESIAS: 0
HEADACHES: 0
WEAKNESS: 0
CONSTIPATION: 0
FREQUENCY: 0

## 2023-03-28 ASSESSMENT — ACTIVITIES OF DAILY LIVING (ADL): CURRENT_FUNCTION: NO ASSISTANCE NEEDED

## 2023-03-28 ASSESSMENT — PAIN SCALES - GENERAL: PAINLEVEL: NO PAIN (0)

## 2023-03-28 NOTE — LETTER
March 28, 2023      Yazminlance Heart  [address] [facility] [address]        Dear ,    We are writing to inform you of your test results.Your blood counts, kidney function and electrolytes returned normal. Your blood sugar was very mildly elevated. Your cholesterol levels are a bit further elevated compared to previous. Your 10 year heart risk score is 6.2% as outlined below. We typically recommend starting a cholesterol lowering medication when you are at or above 7.5%. At this time I would recommend really focusing on healthy lifestyle and rechecking labs in 6-12 months. Urine did not show signs of infection or current kidney stone.     The 10-year ASCVD risk score (Anshul LAO, et al., 2019) is: 6.2%    Values used to calculate the score:      Age: 67 years      Sex: Female      Is Non- : No      Diabetic: No      Tobacco smoker: No      Systolic Blood Pressure: 124 mmHg      Is BP treated: No      HDL Cholesterol: 46 mg/dL      Total Cholesterol: 237 mg/dL          Resulted Orders   UA reflex to Microscopic and Culture   Result Value Ref Range    Color Urine Light Yellow Colorless, Straw, Light Yellow, Yellow    Appearance Urine Clear Clear    Glucose Urine Negative Negative mg/dL    Bilirubin Urine Negative Negative    Ketones Urine Negative Negative mg/dL    Specific Gravity Urine 1.021 1.000 - 1.030    Blood Urine Negative Negative    pH Urine 5.5 5.0 - 9.0    Protein Albumin Urine Negative Negative mg/dL    Urobilinogen Urine Normal Normal, 2.0 mg/dL    Nitrite Urine Negative Negative    Leukocyte Esterase Urine Moderate (A) Negative    Mucus Urine Present (A) None Seen /LPF    RBC Urine 1 <=2 /HPF    WBC Urine 4 <=5 /HPF    Narrative    Urine Culture ordered based on laboratory criteria   Lipid Panel   Result Value Ref Range    Cholesterol 237 (H) <200 mg/dL    Triglycerides 239 (H) <150 mg/dL    Direct Measure HDL 46 (L) >=50 mg/dL    LDL Cholesterol Calculated  143 (H) <=100 mg/dL    Non HDL Cholesterol 191 (H) <130 mg/dL    Narrative    Cholesterol  Desirable:  <200 mg/dL    Triglycerides  Normal:  Less than 150 mg/dL  Borderline High:  150-199 mg/dL  High:  200-499 mg/dL  Very High:  Greater than or equal to 500 mg/dL    Direct Measure HDL  Female:  Greater than or equal to 50 mg/dL   Male:  Greater than or equal to 40 mg/dL    LDL Cholesterol  Desirable:  <100mg/dL  Above Desirable:  100-129 mg/dL   Borderline High:  130-159 mg/dL   High:  160-189 mg/dL   Very High:  >= 190 mg/dL    Non HDL Cholesterol  Desirable:  130 mg/dL  Above Desirable:  130-159 mg/dL  Borderline High:  160-189 mg/dL  High:  190-219 mg/dL  Very High:  Greater than or equal to 220 mg/dL   Basic Metabolic Panel   Result Value Ref Range    Sodium 139 136 - 145 mmol/L    Potassium 4.0 3.4 - 5.3 mmol/L    Chloride 101 98 - 107 mmol/L    Carbon Dioxide (CO2) 28 22 - 29 mmol/L    Anion Gap 10 7 - 15 mmol/L    Urea Nitrogen 14.3 8.0 - 23.0 mg/dL    Creatinine 0.65 0.51 - 0.95 mg/dL    Calcium 9.3 8.8 - 10.2 mg/dL    Glucose 107 (H) 70 - 99 mg/dL    GFR Estimate >90 >60 mL/min/1.73m2      Comment:      eGFR calculated using 2021 CKD-EPI equation.   CBC with platelets and differential   Result Value Ref Range    WBC Count 3.8 (L) 4.0 - 11.0 10e3/uL    RBC Count 4.85 3.80 - 5.20 10e6/uL    Hemoglobin 14.3 11.7 - 15.7 g/dL    Hematocrit 42.2 35.0 - 47.0 %    MCV 87 78 - 100 fL    MCH 29.5 26.5 - 33.0 pg    MCHC 33.9 31.5 - 36.5 g/dL    RDW 12.4 10.0 - 15.0 %    Platelet Count 216 150 - 450 10e3/uL    % Neutrophils 48 %    % Lymphocytes 39 %    % Monocytes 9 %    % Eosinophils 3 %    % Basophils 1 %    % Immature Granulocytes 0 %    NRBCs per 100 WBC 0 <1 /100    Absolute Neutrophils 1.8 1.6 - 8.3 10e3/uL    Absolute Lymphocytes 1.5 0.8 - 5.3 10e3/uL    Absolute Monocytes 0.3 0.0 - 1.3 10e3/uL    Absolute Eosinophils 0.1 0.0 - 0.7 10e3/uL    Absolute Basophils 0.1 0.0 - 0.2 10e3/uL    Absolute Immature  Granulocytes 0.0 <=0.4 10e3/uL    Absolute NRBCs 0.0 10e3/uL       If you have any questions or concerns, please call the clinic at the number listed above.       Sincerely,      Clementina Cam PA-C

## 2023-03-28 NOTE — PROGRESS NOTES
"SUBJECTIVE:   Debra is a 67 year old who presents for Preventive Visit.  No flowsheet data found.Patient has been advised of split billing requirements and indicates understanding: Yes  Are you in the first 12 months of your Medicare coverage?  No    Healthy Habits:     In general, how would you rate your overall health?  Excellent    Frequency of exercise:  6-7 days/week    Duration of exercise:  15-30 minutes    Do you usually eat at least 4 servings of fruit and vegetables a day, include whole grains    & fiber and avoid regularly eating high fat or \"junk\" foods?  Yes    Taking medications regularly:  Not Applicable    Medication side effects:  None    Ability to successfully perform activities of daily living:  No assistance needed    Home Safety:  No safety concerns identified    Hearing Impairment:  No hearing concerns    In the past 6 months, have you been bothered by leaking of urine?  No    In general, how would you rate your overall mental or emotional health?  Excellent      PHQ-2 Total Score: 0    Additional concerns today:  Yes      Have you ever done Advance Care Planning? (For example, a Health Directive, POLST, or a discussion with a medical provider or your loved ones about your wishes): Yes, advance care planning is on file.       Fall risk  Fallen 2 or more times in the past year?: No  Any fall with injury in the past year?: No    Cognitive Screening   1) Repeat 3 items (Leader, Season, Table)    2) Clock draw: NORMAL  3) 3 item recall: Recalls 3 objects  Results: 3 items recalled: COGNITIVE IMPAIRMENT LESS LIKELY    Mini-CogTM Copyright S Kike. Licensed by the author for use in [facility]; reprinted with permission (khushi@.[email or web address]). All rights reserved.      Do you have sleep apnea, excessive snoring or daytime drowsiness?: no    Reviewed and updated as needed this visit by clinical staff   Tobacco  Allergies  Meds      Soc Hx        Reviewed and updated as needed this visit by " Provider                 Social History     Tobacco Use     Smoking status: Never     Smokeless tobacco: Never   Substance Use Topics     Alcohol use: Yes     Alcohol/week: 0.8 standard drinks     Comment: Alcoholic Drinks/day: occasionally/socially         Alcohol Use 3/27/2023   Prescreen: >3 drinks/day or >7 drinks/week? No     Do you have a current opioid prescription? No  Do you use any other controlled substances or medications that are not prescribed by a provider? None      Current providers sharing in care for this patient include:   Patient Care Team:  Clementina Cam PA-C as PCP - General (Physician Assistant)  Clementina Cam PA-C as Assigned PCP    The following health maintenance items are reviewed in Epic and correct as of today:  Health Maintenance   Topic Date Due     DEXA  Never done     HIV SCREENING  Never done     HEPATITIS C SCREENING  Never done     DTAP/TDAP/TD IMMUNIZATION (1 - Tdap) 02/08/2006     ZOSTER IMMUNIZATION (1 of 2) Never done     COLORECTAL CANCER SCREENING  01/01/2020     COVID-19 Vaccine (4 - Booster for Pfizer series) 05/02/2022     MEDICARE ANNUAL WELLNESS VISIT  Never done     MAMMO SCREENING  09/22/2022     INFLUENZA VACCINE (1) 06/30/2023 (Originally 9/1/2022)     FALL RISK ASSESSMENT  03/28/2024     LIPID  03/28/2028     ADVANCE CARE PLANNING  03/28/2028     PHQ-2 (once per calendar year)  Completed     Pneumococcal Vaccine: 65+ Years  Completed     IPV IMMUNIZATION  Aged Out     MENINGITIS IMMUNIZATION  Aged Out     PAP  Discontinued     Labs reviewed in EPIC  Pneumonia Vaccine:For adults 65 years or older who do not have an immunocompromising condition, cerebrospinal fluid leak, or cochlear implant and want to receive PPSV23 ONLY: Administer 1 dose of PPSV23. Anyone who received any doses of PPSV23 before age 65 should receive 1 final dose of the vaccine at age 65 or older. Administer this last dose at least 5 years after the prior PPSV23 dose.  Mammogram  "Screening: Mammogram Screening: Recommended mammography every 1-2 years with patient discussion and risk factor consideration  Any new diagnosis of family breast, ovarian, or bowel cancer? No    FHS-7: No flowsheet data found.    Mammogram Screening: Recommended mammography every 1-2 years with patient discussion and risk factor consideration  Pertinent mammograms are reviewed under the imaging tab.    Review of Systems   Constitutional: Negative for chills.   HENT: Negative for congestion, ear pain, hearing loss and sore throat.    Eyes: Negative for pain and visual disturbance.   Respiratory: Negative for cough and shortness of breath.    Cardiovascular: Negative for chest pain, palpitations and peripheral edema.   Gastrointestinal: Negative for abdominal pain, constipation, diarrhea, heartburn, hematochezia and nausea.   Breasts:  Positive for tenderness. Negative for breast mass and discharge.   Genitourinary: Negative for dysuria, frequency, genital sores, hematuria, pelvic pain, urgency, vaginal bleeding and vaginal discharge.   Musculoskeletal: Negative for arthralgias, joint swelling and myalgias.   Skin: Negative for rash.   Neurological: Negative for dizziness, weakness, headaches and paresthesias.   Psychiatric/Behavioral: Negative for mood changes. The patient is not nervous/anxious.        OBJECTIVE:   /76   Pulse 82   Temp (!) 96.5  F (35.8  C)   Resp 12   Ht 1.619 m (5' 3.75\")   Wt 87.8 kg (193 lb 9.6 oz)   SpO2 96%   BMI 33.49 kg/m   Estimated body mass index is 33.49 kg/m  as calculated from the following:    Height as of this encounter: 1.619 m (5' 3.75\").    Weight as of this encounter: 87.8 kg (193 lb 9.6 oz).  Physical Exam  GENERAL: healthy, alert and no distress  EYES: Eyes grossly normal to inspection, PERRL and conjunctivae and sclerae normal  HENT: ear canals and TM's normal, nose and mouth without ulcers or lesions  NECK: no adenopathy, no asymmetry, masses, or scars and " thyroid normal to palpation  RESP: lungs clear to auscultation - no rales, rhonchi or wheezes  BREAST: normal without masses, tenderness or nipple discharge and no palpable axillary masses or adenopathy  CV: regular rate and rhythm, normal S1 S2, no S3 or S4, no murmur, click or rub, no peripheral edema and peripheral pulses strong  ABDOMEN: soft, nontender, no hepatosplenomegaly, no masses and bowel sounds normal  MS: no gross musculoskeletal defects noted, no edema  SKIN: no suspicious lesions or rashes  NEURO: Normal strength and tone, mentation intact and speech normal  PSYCH: mentation appears normal, affect normal/bright    Diagnostic Test Results:  Labs reviewed in Epic    ASSESSMENT / PLAN:       ICD-10-CM    1. Initial Medicare annual wellness visit  Z00.00 CBC and Differential     Basic Metabolic Panel     Lipid Panel     Lipid Panel     Basic Metabolic Panel     CBC and Differential      2. Post-menopausal  Z78.0 DX Hip/Pelvis/Spine      3. Screening for osteoporosis  Z13.820 DX Hip/Pelvis/Spine      4. Encounter for screening mammogram for breast cancer  Z12.31 MA Screen Bilateral w/Andrzej      5. Special screening for malignant neoplasms, colon  Z12.11 Colonoscopy Screening  Referral      6. Lipid screening  Z13.220 Lipid Panel     Lipid Panel      7. Dysuria  R30.0 UA reflex to Microscopic and Culture     UA reflex to Microscopic and Culture     Urine Culture      8. Need for pneumococcal vaccination  Z23 PNEUMOCOCCAL 20 VALENT CONJUGATE (PREVNAR 20)        1. Basic lab work pending as above. Encourage focusing on healthy lifestyle.     2, 3. DEXA scan ordered.     4. Mammogram ordered.     5. Referral for colonoscopy.     6. Lipids elevated. ASCVD score at 6.2%. Will discuss results with patient and encourage continuing to focus on healthy lifestyle. Recheck in 6 months and if elevating further, consider low dose statin.     The 10-year ASCVD risk score (Anshul DK, et al., 2019) is: 6.2%     Values used to calculate the score:      Age: 67 years      Sex: Female      Is Non- : No      Diabetic: No      Tobacco smoker: No      Systolic Blood Pressure: 124 mmHg      Is BP treated: No      HDL Cholesterol: 46 mg/dL      Total Cholesterol: 237 mg/dL    7. UA without definitive signs of infection.     8. PCV 20 updated. Will obtain Tdap through local pharmacy     Patient has been advised of split billing requirements and indicates understanding: Yes      COUNSELING:  Reviewed preventive health counseling, as reflected in patient instructions        She reports that she has never smoked. She has never used smokeless tobacco.      Appropriate preventive services were discussed with this patient, including applicable screening as appropriate for cardiovascular disease, diabetes, osteopenia/osteoporosis, and glaucoma.  As appropriate for age/gender, discussed screening for colorectal cancer, prostate cancer, breast cancer, and cervical cancer. Checklist reviewing preventive services available has been given to the patient.    Reviewed patients plan of care and provided an AVS. The Basic Care Plan (routine screening as documented in Health Maintenance) for Debra meets the Care Plan requirement. This Care Plan has been established and reviewed with the Patient.      Clmeentina Cam PA-C  [facility]    Identified Health Risks:    I have reviewed Opioid Use Disorder and Substance Use Disorder risk factors and made any needed referrals.

## 2023-03-28 NOTE — PATIENT INSTRUCTIONS
We put in orders to get you back up to date on lab work, mammogram, colonoscopy, and DEXA/bone density screening.     They will call you to get additional appointments set up for the screening exams.     I will send a letter with lab results.     We gave you the first of two pneumonia vaccines today. You are also due for an updated tetanus vaccine. This can be completed through any local pharmacy per Medicare guidelines.

## 2023-03-28 NOTE — NURSING NOTE
Patient presents to clinic for medicare wellness visit. She has moles on chest area between breast that she would like looked at.  Alicia Carrion LPN ....................  3/28/2023   8:00 AM

## 2023-03-29 DIAGNOSIS — Z12.11 SPECIAL SCREENING FOR MALIGNANT NEOPLASMS, COLON: Primary | ICD-10-CM

## 2023-03-29 RX ORDER — BISACODYL 5 MG
TABLET, DELAYED RELEASE (ENTERIC COATED) ORAL
Qty: 2 TABLET | Refills: 0 | Status: ON HOLD | OUTPATIENT
Start: 2023-03-29 | End: 2023-07-12

## 2023-03-29 RX ORDER — POLYETHYLENE GLYCOL 3350, SODIUM CHLORIDE, SODIUM BICARBONATE, POTASSIUM CHLORIDE 420; 11.2; 5.72; 1.48 G/4L; G/4L; G/4L; G/4L
4000 POWDER, FOR SOLUTION ORAL ONCE
Qty: 4000 ML | Refills: 0 | Status: SHIPPED | OUTPATIENT
Start: 2023-03-29 | End: 2023-03-29

## 2023-03-29 NOTE — TELEPHONE ENCOUNTER
Screening Questions for the Scheduling of Screening Colonoscopies   (If Colonoscopy is diagnostic, Provider should review the chart before scheduling.)  Are you younger than 50 or older than 80?  NO  Do you take aspirin or fish oil?  NO (if yes, tell patient to stop 1 week prior to Colonoscopy)  Do you take warfarin (Coumadin), clopidogrel (Plavix), apixaban (Eliquis), dabigatram (Pradaxa), rivaroxaban (Xarelto) or any blood thinner? NO  Do you use oxygen at home?  NO  Do you have kidney disease? NO  Are you on dialysis? NO  Have you had a stroke or heart attack in the last year? NO  Have you had a stent in your heart or any blood vessel in the last year? NO  Have you had a transplant of any organ? NO  Have you had a colonoscopy or upper endoscopy (EGD) before? YES         When?  2010  Date of scheduled Colonoscopy. 07/12/2023  Provider [facility]  Pharmacy [facility]

## 2023-03-30 LAB — BACTERIA UR CULT: NO GROWTH

## 2023-04-28 ENCOUNTER — HOSPITAL ENCOUNTER (OUTPATIENT)
Dept: MAMMOGRAPHY | Facility: OTHER | Age: 68
Discharge: HOME | End: 2023-04-28
Attending: PHYSICIAN ASSISTANT | Admitting: PHYSICIAN ASSISTANT
Payer: MEDICARE

## 2023-04-28 DIAGNOSIS — Z12.31 ENCOUNTER FOR SCREENING MAMMOGRAM FOR BREAST CANCER: ICD-10-CM

## 2023-04-28 PROCEDURE — 77067 SCR MAMMO BI INCL CAD: CPT

## 2023-05-05 NOTE — PROGRESS NOTES
Assessment & Plan     1. Chronic pain of right knee  2. Arthritis of right knee  Chronic, progressing.  Does have known arthritis.  Patient requesting MRI for follow-up based on previous results.  Order placed.  Refilled naproxen to be used as needed for symptomatic management.  - MR Knee Right w/o Contrast; Future  - naproxen (NAPROSYN) 500 MG tablet; TAKE 1 TABLET BY MOUTH TWICE DAILY WITH MEALS FOR  5  TO  7  DAYS,  THEN  AS  NEEDED  THEREAFTER  Dispense: 60 tablet; Refill: 2    3. Family history of rheumatoid arthritis  Patient reports a strong family history of rheumatoid arthritis.  Did discuss previously normal lab work, patient requesting repeat.  Will notify with results.  - Anti Nuclear Ena IgG by IFA with Reflex; Future  - Rheumatoid factor; Future  - Sedimentation Rate (ESR); Future  - CRP inflammation; Future  - CRP inflammation  - Sedimentation Rate (ESR)  - Rheumatoid factor  - Anti Nuclear Ena IgG by IFA with Reflex    4. Healthcare maintenance  Patient requesting TSH checked.  Will notify with results.  - TSH Reflex GH; Future  - TSH Reflex GH    5. Dysphagia, unspecified type  Faculties with swallowing after anaphylactic reaction to clindamycin in the past.  Continuing to struggle dysphagia more with solid foods.  Ordered EGD to hopefully be added to colonoscopy scheduled for July per patient request.  - Adult GI  Referral - Procedure Only; Future    6. Family history of valvular heart disease  Patient has strong family history of valvular heart disease.  Did have stable echocardiogram completed in 2014.  Given she is asymptomatic and is not likely indicated to be repeated.  Medicare will also not cover unless patient is symptomatic or has a personal history of heart disease.  Follow-up as needed.      No follow-ups on file.    Clementina Cam PA-C  [facility] is a 67 year old, presenting for the following health issues:  Patient presents  with:  Musculoskeletal Problem: R knee pain    Patient reports she has a Hx of Baker's cyst on R knee; also states that she had a fall beginning of April, 2023 and landed on R knee as well          History of Present Illness       Reason for visit:  I want to get an MRI on my right knee. I would like a blood test for rheumatoid arthritis, and thyroid. And to go over my other blood work and a mammogram results    She eats 2-3 servings of fruits and vegetables daily.She consumes 0 sweetened beverage(s) daily.She exercises with enough effort to increase her heart rate 60 or more minutes per day.  She exercises with enough effort to increase her heart rate 7 days per week.   She is taking medications regularly.     Here for discussion regarding multiple concerns.    Right knee:  Patient has longstanding right knee pain.  Had MRI completed in 2020 which showed degenerative changes as well as Baker's cyst.  Patient has continued to struggle with worsening pain in right knee.  Difficulties when standing and walking for long periods, going up and down stairs.  Reports she did have a fall landing on her right knee in April of this year.  Managing symptomatically.  Has been given naproxen by sports medicine is requesting a refill of this.    RA:  Patient reports significant family history of rheumatoid arthritis with her mother and all of her sisters.  She does struggle with some joint aches.  She did have lab work completed regarding this in 2020.  Patient is requesting to have this completed again.    Family history of valvular disease:  Patient reports family history of valvular disease in 7 of her 8 siblings as well as her mother.  She did have echocardiogram completed regarding this in 2014 which was stable.  Patient is requesting follow-up be completed.  She is able to be active with minimal difficulties breathing, no chest pain, pressure, palpitations, dizziness lightheadedness, syncope, headaches, vision  changes.    Dysphagia:  Patient has been struggle with some difficulties with food catching when swallowing.  Reports that she had a course of clindamycin for dental infection which caused her throat to swell.  This medication was immediately discontinued.  Since then she reports she has noticed continued difficulties with swallowing.  No odynophagia, chest pain or pressure, heartburn or reflux.  Patient does have colonoscopy scheduled for July and is wondering if she could have upper scoping completed at that time.    Patient is also requesting to have her thyroid checked.    PAST MEDICAL HISTORY:   Past Medical History:   Diagnosis Date     Allergic contact dermatitis due to plants, except food     Recurring poison ivy     Calculus of kidney     Kidney stones (calcium).     Enthesopathy     Right great toe bone spur.     Menstrual migraine without status migrainosus, not intractable     Menstrual migraine     Other intervertebral disc displacement, lumbar region     ,Herniated disc (lumbar) x's two.       PAST SURGICAL HISTORY:   Past Surgical History:   Procedure Laterality Date     1ST MTP FUSION, 2ND HAMMERTOE REPAIR Right 10/29/2013     ARTHROSCOPY KNEE  1996    Bilateral arthroscopy with lateral releases in  and .     COLONOSCOPY  2010,Due 2020     LAPAROSCOPIC TUBAL LIGATION      25     MAMMOPLASTY REDUCTION       SLING BLADDER SUSPENSION WITH FASCIA SHARA       TOE SURGERY      x2     TONSILLECTOMY      24       FAMILY HISTORY:   Family History   Problem Relation Age of Onset     Hypertension Father      Kidney failure Father      Irritable Bowel Syndrome Sister      Hypertension Mother      Heart Disease Mother         Heart Disease, replaced heart valve     Arthritis Mother         Arthritis,Rheumatoid arthritis     Other - See Comments Sister         Acute intermittent porphyria, kidney transplant     Heart Disease Maternal Grandfather         Heart Disease, of  diabetes     Breast Cancer Paternal Grandmother         Cancer-breast       SOCIAL HISTORY:   Social History     Tobacco Use     Smoking status: Never     Smokeless tobacco: Never   Vaping Use     Vaping status: Never Used   Substance Use Topics     Alcohol use: Yes     Alcohol/week: 0.8 standard drinks of alcohol     Comment: Alcoholic Drinks/day: occasionally/socially        Allergies   Allergen Reactions     Clindamycin Anaphylaxis     Tramadol Rash     Current Outpatient Medications   Medication     bisacodyl (DULCOLAX) 5 MG EC tablet     naproxen (NAPROSYN) 500 MG tablet     No current facility-administered medications for this visit.         Review of Systems   Per HPI        Objective    /74 (BP Location: Right arm, Patient Position: Sitting, Cuff Size: Adult Regular)   Pulse 60   Temp 97.3  F (36.3  C) (Tympanic)   Resp 14   Wt 90.6 kg (199 lb 12.8 oz)   SpO2 99%   Breastfeeding No   BMI 34.57 kg/m    Body mass index is 34.57 kg/m .  Physical Exam   General: Pleasant, in no apparent distress.  Eyes: Sclera are white and conjunctiva are clear bilaterally. Lacrimal apparatus free of erythema, edema, and discharge bilaterally.  Ears: External ears without erythema or edema.   Musculoskeletal: Tenderness palpation throughout anterior right knee.  Mildly limited range of motion of right knee compared to left.  Nonantalgic gait in clinic.  Neurologic Exam: Normal gross motor, tone coordination and no visible tremor.  Skin: No jaundice, pallor, rashes, or lesions on exposed skin.   Psych: Appropriate mood and affect.

## 2023-05-08 ENCOUNTER — OFFICE VISIT (OUTPATIENT)
Dept: FAMILY MEDICINE | Facility: OTHER | Age: 68
End: 2023-05-08
Attending: PHYSICIAN ASSISTANT
Payer: MEDICARE

## 2023-05-08 VITALS
BODY MASS INDEX: 34.57 KG/M2 | HEART RATE: 60 BPM | SYSTOLIC BLOOD PRESSURE: 124 MMHG | TEMPERATURE: 97.3 F | RESPIRATION RATE: 14 BRPM | OXYGEN SATURATION: 99 % | WEIGHT: 199.8 LBS | DIASTOLIC BLOOD PRESSURE: 74 MMHG

## 2023-05-08 DIAGNOSIS — G89.29 CHRONIC PAIN OF RIGHT KNEE: Primary | ICD-10-CM

## 2023-05-08 DIAGNOSIS — M25.561 CHRONIC PAIN OF RIGHT KNEE: Primary | ICD-10-CM

## 2023-05-08 DIAGNOSIS — Z82.49 FAMILY HISTORY OF VALVULAR HEART DISEASE: ICD-10-CM

## 2023-05-08 DIAGNOSIS — Z00.00 HEALTHCARE MAINTENANCE: ICD-10-CM

## 2023-05-08 DIAGNOSIS — Z82.61 FAMILY HISTORY OF RHEUMATOID ARTHRITIS: ICD-10-CM

## 2023-05-08 DIAGNOSIS — M17.11 ARTHRITIS OF RIGHT KNEE: ICD-10-CM

## 2023-05-08 DIAGNOSIS — R13.10 DYSPHAGIA, UNSPECIFIED TYPE: ICD-10-CM

## 2023-05-08 LAB
CRP SERPL-MCNC: 3.31 MG/L
ERYTHROCYTE [SEDIMENTATION RATE] IN BLOOD BY WESTERGREN METHOD: 6 MM/HR (ref 0–30)
TSH SERPL DL<=0.005 MIU/L-ACNC: 0.66 UIU/ML (ref 0.3–4.2)

## 2023-05-08 PROCEDURE — 86431 RHEUMATOID FACTOR QUANT: CPT | Mod: ZL | Performed by: PHYSICIAN ASSISTANT

## 2023-05-08 PROCEDURE — 86140 C-REACTIVE PROTEIN: CPT | Mod: ZL | Performed by: PHYSICIAN ASSISTANT

## 2023-05-08 PROCEDURE — 86038 ANTINUCLEAR ANTIBODIES: CPT | Mod: ZL | Performed by: PHYSICIAN ASSISTANT

## 2023-05-08 PROCEDURE — G0463 HOSPITAL OUTPT CLINIC VISIT: HCPCS

## 2023-05-08 PROCEDURE — 99214 OFFICE O/P EST MOD 30 MIN: CPT | Performed by: PHYSICIAN ASSISTANT

## 2023-05-08 PROCEDURE — 85652 RBC SED RATE AUTOMATED: CPT | Mod: ZL | Performed by: PHYSICIAN ASSISTANT

## 2023-05-08 PROCEDURE — 84443 ASSAY THYROID STIM HORMONE: CPT | Mod: ZL | Performed by: PHYSICIAN ASSISTANT

## 2023-05-08 PROCEDURE — 36415 COLL VENOUS BLD VENIPUNCTURE: CPT | Mod: ZL | Performed by: PHYSICIAN ASSISTANT

## 2023-05-08 RX ORDER — AMOXICILLIN AND CLAVULANATE POTASSIUM 500; 125 MG/1; MG/1
TABLET, FILM COATED ORAL
COMMUNITY
Start: 2023-02-03 | End: 2023-05-08

## 2023-05-08 RX ORDER — NAPROXEN 500 MG/1
TABLET ORAL
Qty: 60 TABLET | Refills: 2 | Status: SHIPPED | OUTPATIENT
Start: 2023-05-08 | End: 2023-12-01

## 2023-05-08 ASSESSMENT — PAIN SCALES - GENERAL: PAINLEVEL: MODERATE PAIN (5)

## 2023-05-08 NOTE — LETTER
May 9, 2023      Yazmin Morgan  [id number] [facility] [address]        Dear ,    We are writing to inform you of your test results. Thyroid and rheumatoid labs returned normal.     Unfortunately your Medicare insurance will not cover a follow up echocardiogram based solely on your family history. If you develop any heart related symptoms in the future we can consider updating imagine at that time.     Resulted Orders   TSH Reflex GH   Result Value Ref Range    TSH 0.66 0.30 - 4.20 uIU/mL   CRP inflammation   Result Value Ref Range    CRP Inflammation 3.31 <5.00 mg/L   Sedimentation Rate (ESR)   Result Value Ref Range    Erythrocyte Sedimentation Rate 6 0 - 30 mm/hr   Rheumatoid factor   Result Value Ref Range    Rheumatoid Factor <7 <12 IU/mL   Anti Nuclear Ena IgG by IFA with Reflex   Result Value Ref Range    KATHE interpretation Negative Negative      Comment:        Negative:              <1:40  Borderline Positive:   1:40 - 1:80  Positive:              >1:80       If you have any questions or concerns, please call the clinic at the number listed above.       Sincerely,      Clementina Cam PA-C

## 2023-05-08 NOTE — NURSING NOTE
"Chief Complaint   Patient presents with     Musculoskeletal Problem     R knee pain       Initial /74 (BP Location: Right arm, Patient Position: Sitting, Cuff Size: Adult Regular)   Pulse 60   Temp 97.3  F (36.3  C) (Tympanic)   Resp 14   Wt 90.6 kg (199 lb 12.8 oz)   SpO2 99%   Breastfeeding No   BMI 34.57 kg/m   Estimated body mass index is 34.57 kg/m  as calculated from the following:    Height as of 3/28/23: 1.619 m (5' 3.75\").    Weight as of this encounter: 90.6 kg (199 lb 12.8 oz).     Medication Reconciliation: complete      FOOD SECURITY SCREENING QUESTIONS:    The next two questions are to help us understand your food security.  If you are feeling you need any assistance in this area, we have resources available to support you today.    Hunger Vital Signs:  Within the past 12 months we worried whether our food would run out before we got money to buy more. Never  Within the past 12 months the food we bought just didn't last and we didn't have money to get more. Never        Advance care plan reviewed      Marsha Graff LPN on 5/8/2023 at 10:28 AM      "

## 2023-05-09 ENCOUNTER — TELEPHONE (OUTPATIENT)
Dept: SURGERY | Facility: OTHER | Age: 68
End: 2023-05-09
Payer: MEDICARE

## 2023-05-09 LAB
ANA SER QL IF: NEGATIVE
RHEUMATOID FACT SER NEPH-ACNC: <7 IU/ML

## 2023-05-09 NOTE — TELEPHONE ENCOUNTER
[facility] Diagnostic Referral    Patient has a referral for a EGD with a diagnosis of Dysphagia, unspecified type.    Please advise.    Thank you,    Caitlyn Hernandez on 5/9/2023 at 3:06 PM'

## 2023-05-10 NOTE — TELEPHONE ENCOUNTER
Ok to add diagnostic EGD to colonoscopy that is all ready scheduled. Thanks! Tere Flower MD on 5/10/2023 at 5:29 PM

## 2023-05-19 ENCOUNTER — HOSPITAL ENCOUNTER (OUTPATIENT)
Dept: MRI IMAGING | Facility: OTHER | Age: 68
Discharge: HOME | End: 2023-05-19
Attending: PHYSICIAN ASSISTANT | Admitting: PHYSICIAN ASSISTANT
Payer: MEDICARE

## 2023-05-19 DIAGNOSIS — M25.561 CHRONIC PAIN OF RIGHT KNEE: ICD-10-CM

## 2023-05-19 DIAGNOSIS — G89.29 CHRONIC PAIN OF RIGHT KNEE: ICD-10-CM

## 2023-05-19 PROCEDURE — 73721 MRI JNT OF LWR EXTRE W/O DYE: CPT | Mod: RT,MG

## 2023-05-19 PROCEDURE — G1010 CDSM STANSON: HCPCS

## 2023-06-02 ENCOUNTER — HOSPITAL ENCOUNTER (OUTPATIENT)
Dept: BONE DENSITY | Facility: OTHER | Age: 68
Discharge: HOME | End: 2023-06-02
Attending: PHYSICIAN ASSISTANT | Admitting: PHYSICIAN ASSISTANT
Payer: MEDICARE

## 2023-06-02 DIAGNOSIS — Z13.820 SCREENING FOR OSTEOPOROSIS: ICD-10-CM

## 2023-06-02 DIAGNOSIS — Z78.0 POST-MENOPAUSAL: ICD-10-CM

## 2023-06-02 PROCEDURE — 77080 DXA BONE DENSITY AXIAL: CPT

## 2023-07-12 ENCOUNTER — ANESTHESIA EVENT (OUTPATIENT)
Dept: SURGERY | Facility: OTHER | Age: 68
End: 2023-07-12
Payer: MEDICARE

## 2023-07-12 ENCOUNTER — HOSPITAL ENCOUNTER (OUTPATIENT)
Facility: OTHER | Age: 68
Discharge: HOME | End: 2023-07-12
Attending: SURGERY | Admitting: SURGERY
Payer: MEDICARE

## 2023-07-12 ENCOUNTER — ANESTHESIA (OUTPATIENT)
Dept: SURGERY | Facility: OTHER | Age: 68
End: 2023-07-12
Payer: MEDICARE

## 2023-07-12 VITALS
SYSTOLIC BLOOD PRESSURE: 130 MMHG | DIASTOLIC BLOOD PRESSURE: 73 MMHG | RESPIRATION RATE: 20 BRPM | BODY MASS INDEX: 33.39 KG/M2 | WEIGHT: 193 LBS | TEMPERATURE: 97.3 F | OXYGEN SATURATION: 96 % | HEART RATE: 58 BPM

## 2023-07-12 DIAGNOSIS — R13.10 DYSPHAGIA, UNSPECIFIED TYPE: Primary | ICD-10-CM

## 2023-07-12 DIAGNOSIS — K57.30 DIVERTICULOSIS OF COLON WITHOUT DIVERTICULITIS: ICD-10-CM

## 2023-07-12 PROCEDURE — 250N000009 HC RX 250: Performed by: NURSE ANESTHETIST, CERTIFIED REGISTERED

## 2023-07-12 PROCEDURE — 88305 TISSUE EXAM BY PATHOLOGIST: CPT

## 2023-07-12 PROCEDURE — 999N000010 HC STATISTIC ANES STAT CODE-CRNA PER MINUTE: Performed by: SURGERY

## 2023-07-12 PROCEDURE — G0121 COLON CA SCRN NOT HI RSK IND: HCPCS | Performed by: SURGERY

## 2023-07-12 PROCEDURE — 43239 EGD BIOPSY SINGLE/MULTIPLE: CPT | Performed by: SURGERY

## 2023-07-12 PROCEDURE — 45378 DIAGNOSTIC COLONOSCOPY: CPT | Performed by: SURGERY

## 2023-07-12 PROCEDURE — 258N000003 HC RX IP 258 OP 636: Performed by: SURGERY

## 2023-07-12 PROCEDURE — 250N000011 HC RX IP 250 OP 636: Mod: JZ | Performed by: NURSE ANESTHETIST, CERTIFIED REGISTERED

## 2023-07-12 PROCEDURE — 43239 EGD BIOPSY SINGLE/MULTIPLE: CPT | Performed by: NURSE ANESTHETIST, CERTIFIED REGISTERED

## 2023-07-12 RX ORDER — NALOXONE HYDROCHLORIDE 0.4 MG/ML
0.4 INJECTION, SOLUTION INTRAMUSCULAR; INTRAVENOUS; SUBCUTANEOUS
Status: DISCONTINUED | OUTPATIENT
Start: 2023-07-12 | End: 2023-07-12 | Stop reason: HOSPADM

## 2023-07-12 RX ORDER — ONDANSETRON 2 MG/ML
4 INJECTION INTRAMUSCULAR; INTRAVENOUS
Status: DISCONTINUED | OUTPATIENT
Start: 2023-07-12 | End: 2023-07-12 | Stop reason: HOSPADM

## 2023-07-12 RX ORDER — FENTANYL CITRATE 50 UG/ML
INJECTION, SOLUTION INTRAMUSCULAR; INTRAVENOUS PRN
Status: DISCONTINUED | OUTPATIENT
Start: 2023-07-12 | End: 2023-07-12

## 2023-07-12 RX ORDER — POLYETHYLENE GLYCOL 3350, SODIUM CHLORIDE, SODIUM BICARBONATE, POTASSIUM CHLORIDE 420; 11.2; 5.72; 1.48 G/4L; G/4L; G/4L; G/4L
420 POWDER, FOR SOLUTION ORAL ONCE
Status: ON HOLD | COMMUNITY
Start: 2023-07-05 | End: 2023-07-12

## 2023-07-12 RX ORDER — SODIUM CHLORIDE, SODIUM LACTATE, POTASSIUM CHLORIDE, CALCIUM CHLORIDE 600; 310; 30; 20 MG/100ML; MG/100ML; MG/100ML; MG/100ML
INJECTION, SOLUTION INTRAVENOUS CONTINUOUS
Status: DISCONTINUED | OUTPATIENT
Start: 2023-07-12 | End: 2023-07-12 | Stop reason: HOSPADM

## 2023-07-12 RX ORDER — PROPOFOL 10 MG/ML
INJECTION, EMULSION INTRAVENOUS PRN
Status: DISCONTINUED | OUTPATIENT
Start: 2023-07-12 | End: 2023-07-12

## 2023-07-12 RX ORDER — LIDOCAINE HYDROCHLORIDE 20 MG/ML
INJECTION, SOLUTION INFILTRATION; PERINEURAL PRN
Status: DISCONTINUED | OUTPATIENT
Start: 2023-07-12 | End: 2023-07-12

## 2023-07-12 RX ORDER — ONDANSETRON 4 MG/1
4 TABLET, ORALLY DISINTEGRATING ORAL EVERY 6 HOURS PRN
Status: DISCONTINUED | OUTPATIENT
Start: 2023-07-12 | End: 2023-07-12 | Stop reason: HOSPADM

## 2023-07-12 RX ORDER — FLUMAZENIL 0.1 MG/ML
0.2 INJECTION, SOLUTION INTRAVENOUS
Status: DISCONTINUED | OUTPATIENT
Start: 2023-07-12 | End: 2023-07-12 | Stop reason: HOSPADM

## 2023-07-12 RX ORDER — NALOXONE HYDROCHLORIDE 0.4 MG/ML
0.2 INJECTION, SOLUTION INTRAMUSCULAR; INTRAVENOUS; SUBCUTANEOUS
Status: DISCONTINUED | OUTPATIENT
Start: 2023-07-12 | End: 2023-07-12 | Stop reason: HOSPADM

## 2023-07-12 RX ORDER — LIDOCAINE 40 MG/G
CREAM TOPICAL
Status: DISCONTINUED | OUTPATIENT
Start: 2023-07-12 | End: 2023-07-12 | Stop reason: HOSPADM

## 2023-07-12 RX ORDER — ONDANSETRON 2 MG/ML
4 INJECTION INTRAMUSCULAR; INTRAVENOUS EVERY 6 HOURS PRN
Status: DISCONTINUED | OUTPATIENT
Start: 2023-07-12 | End: 2023-07-12 | Stop reason: HOSPADM

## 2023-07-12 RX ORDER — PROCHLORPERAZINE MALEATE 5 MG
5 TABLET ORAL EVERY 6 HOURS PRN
Status: DISCONTINUED | OUTPATIENT
Start: 2023-07-12 | End: 2023-07-12 | Stop reason: HOSPADM

## 2023-07-12 RX ADMIN — PROPOFOL 50 MG: 10 INJECTION, EMULSION INTRAVENOUS at 09:55

## 2023-07-12 RX ADMIN — SODIUM CHLORIDE, SODIUM LACTATE, POTASSIUM CHLORIDE, AND CALCIUM CHLORIDE: 600; 310; 30; 20 INJECTION, SOLUTION INTRAVENOUS at 09:51

## 2023-07-12 RX ADMIN — FENTANYL CITRATE 50 MCG: 50 INJECTION, SOLUTION INTRAMUSCULAR; INTRAVENOUS at 09:55

## 2023-07-12 RX ADMIN — LIDOCAINE HYDROCHLORIDE 100 MG: 20 INJECTION, SOLUTION INFILTRATION; PERINEURAL at 09:55

## 2023-07-12 RX ADMIN — MIDAZOLAM HYDROCHLORIDE 2 MG: 1 INJECTION, SOLUTION INTRAMUSCULAR; INTRAVENOUS at 09:51

## 2023-07-12 RX ADMIN — FENTANYL CITRATE 50 MCG: 50 INJECTION, SOLUTION INTRAMUSCULAR; INTRAVENOUS at 09:51

## 2023-07-12 RX ADMIN — PROPOFOL 80 MCG/KG/MIN: 10 INJECTION, EMULSION INTRAVENOUS at 10:05

## 2023-07-12 ASSESSMENT — ACTIVITIES OF DAILY LIVING (ADL)
ADLS_ACUITY_SCORE: 35
ADLS_ACUITY_SCORE: 35

## 2023-07-12 NOTE — ANESTHESIA CARE TRANSFER NOTE
Patient: Debra Heart    Procedure: Procedure(s):  Esophagoscopy, gastroscopy, duodenoscopy (EGD), combined  Colonoscopy       Diagnosis: Encounter for screening colonoscopy [Z12.11]  Diagnosis Additional Information: No value filed.    Anesthesia Type:   MAC     Note:    Oropharynx: oropharynx clear of all foreign objects  Level of Consciousness: awake  Oxygen Supplementation: room air    Independent Airway: airway patency satisfactory and stable  Dentition: dentition unchanged  Vital Signs Stable: post-procedure vital signs reviewed and stable  Report to RN Given: handoff report given  Patient transferred to: Phase II    Handoff Report: Identifed the Patient, Identified the Reponsible Provider, Reviewed the pertinent medical history, Discussed the surgical course, Reviewed Intra-OP anesthesia mangement and issues during anesthesia, Set expectations for post-procedure period and Allowed opportunity for questions and acknowledgement of understanding      Vitals:  Vitals Value Taken Time   BP     Temp     Pulse     Resp     SpO2         Electronically Signed By: FITO ZAFAR CRNA  July 12, 2023  10:23 AM

## 2023-07-12 NOTE — ANESTHESIA PREPROCEDURE EVALUATION
Anesthesia Pre-Procedure Evaluation    Patient: Debra Heart   MRN: [id number] : 1955        Procedure : Procedure(s):  Esophagoscopy, gastroscopy, duodenoscopy (EGD), combined  Colonoscopy          Past Medical History:   Diagnosis Date     Allergic contact dermatitis due to plants, except food     Recurring poison ivy     Calculus of kidney     Kidney stones (calcium).     Enthesopathy     Right great toe bone spur.     Menstrual migraine without status migrainosus, not intractable     Menstrual migraine     Other intervertebral disc displacement, lumbar region     ,Herniated disc (lumbar) x's two.      Past Surgical History:   Procedure Laterality Date     1ST MTP FUSION, 2ND HAMMERTOE REPAIR Right 10/29/2013     ARTHROSCOPY KNEE      Bilateral arthroscopy with lateral releases in  and .     COLONOSCOPY  2010,Due 2020     LAPAROSCOPIC TUBAL LIGATION           MAMMOPLASTY REDUCTION       SLING BLADDER SUSPENSION WITH FASCIA SHARA       TOE SURGERY      x2     TONSILLECTOMY      22      Allergies   Allergen Reactions     Clindamycin Anaphylaxis     Tramadol Rash      Social History     Tobacco Use     Smoking status: Never     Smokeless tobacco: Never   Substance Use Topics     Alcohol use: Yes     Alcohol/week: 0.8 standard drinks of alcohol     Comment: Alcoholic Drinks/day: occasionally/socially      Wt Readings from Last 1 Encounters:   23 90.6 kg (199 lb 12.8 oz)        Anesthesia Evaluation   Pt has had prior anesthetic.     History of anesthetic complications  - PONV.      ROS/MED HX  ENT/Pulmonary:  - neg pulmonary ROS     Neurologic:  - neg neurologic ROS     Cardiovascular:     (+) Dyslipidemia -----    METS/Exercise Tolerance: >4 METS    Hematologic:  - neg hematologic  ROS     Musculoskeletal: Comment: Lumbar disc dx      GI/Hepatic: Comment: Occasional sx reflux    (+) GERD, bowel prep,     Renal/Genitourinary: Comment: Hx kidney stones  - neg  Renal ROS  (-) renal disease   Endo:     (+) Obesity,     Psychiatric/Substance Use:  - neg psychiatric ROS     Infectious Disease:  - neg infectious disease ROS     Malignancy:  - neg malignancy ROS     Other:  - neg other ROS    (+) , H/O Chronic Pain,        Physical Exam    Airway        Mallampati: II   TM distance: > 3 FB   Neck ROM: full   Mouth opening: > 3 cm    Respiratory Devices and Support         Dental       (+) Minor Abnormalities - some fillings, tiny chips      Cardiovascular   cardiovascular exam normal       Rhythm and rate: regular and normal     Pulmonary   pulmonary exam normal        breath sounds clear to auscultation           OUTSIDE LABS:  CBC:   Lab Results   Component Value Date    WBC 3.8 (L) 03/28/2023    WBC 4.6 08/24/2020    HGB 14.3 03/28/2023    HGB 14.4 08/24/2020    HCT 42.2 03/28/2023    HCT 43.7 08/24/2020     03/28/2023     08/24/2020     BMP:   Lab Results   Component Value Date     03/28/2023     08/24/2020    POTASSIUM 4.0 03/28/2023    POTASSIUM 3.9 08/24/2020    CHLORIDE 101 03/28/2023    CHLORIDE 104 08/24/2020    CO2 28 03/28/2023    CO2 28 08/24/2020    BUN 14.3 03/28/2023    BUN 16 08/24/2020    CR 0.65 03/28/2023    CR 0.72 08/24/2020     (H) 03/28/2023     08/24/2020     COAGS: No results found for: PTT, INR, FIBR  POC: No results found for: BGM, HCG, HCGS  HEPATIC:   Lab Results   Component Value Date    ALBUMIN 4.7 08/24/2020    PROTTOTAL 7.5 08/24/2020    ALT 31 08/24/2020    AST 25 08/24/2020    ALKPHOS 93 08/24/2020    BILITOTAL 0.7 08/24/2020     OTHER:   Lab Results   Component Value Date    A1C 6.0 08/24/2020    LAUREN 9.3 03/28/2023    TSH 0.66 05/08/2023    CRP 0.4 10/11/2018    SED 6 05/08/2023       Anesthesia Plan    ASA Status:  2   NPO Status:  NPO Appropriate    Anesthesia Type: MAC.              Consents    Anesthesia Plan(s) and associated risks, benefits, and realistic alternatives discussed. Questions  answered and patient/representative(s) expressed understanding.     - Discussed: Risks, Benefits and Alternatives for BOTH SEDATION and the PROCEDURE were discussed     - Discussed with:  Patient      - Extended Intubation/Ventilatory Support Discussed: No.      - Patient is DNR/DNI Status: No    Use of blood products discussed: No .     Postoperative Care            Comments:                FITO Pabon CRNA

## 2023-07-12 NOTE — H&P
CHIEF COMPLAINT / REASON FOR PROCEDURE:  Dysphagia, due for screening colonoscopy    PERTINENT HISTORY   Patient complains of food sticking. Previous colonoscopy 2010-no adenomatous polyps, previous EGD none. No family history of colon polyps or colon cancer.  Past Medical History:   Diagnosis Date     Allergic contact dermatitis due to plants, except food     Recurring poison ivy     Calculus of kidney     Kidney stones (calcium).     Enthesopathy     Right great toe bone spur.     Menstrual migraine without status migrainosus, not intractable     Menstrual migraine     Other intervertebral disc displacement, lumbar region     1999,Herniated disc (lumbar) x's two.     Past Surgical History:   Procedure Laterality Date     1ST MTP FUSION, 2ND HAMMERTOE REPAIR Right 10/29/2013     ARTHROSCOPY KNEE  1996    Bilateral arthroscopy with lateral releases in 1996 and 1997.     COLONOSCOPY  01/01/2010 2010,Due 2020     LAPAROSCOPIC TUBAL LIGATION      25     MAMMOPLASTY REDUCTION  1993     SLING BLADDER SUSPENSION WITH FASCIA SHARA  2010     TOE SURGERY      x2     TONSILLECTOMY      22     Other:  None  Bleeding tendencies:  No    Relevant Family History: none    Relevant Social History:  none    A relevant review of systems was performed and was Negative. See HPI.    ALLERGIES/SENSITIVITIES:   Allergies   Allergen Reactions     Clindamycin Anaphylaxis     Tramadol Rash        CURRENT MEDICATIONS:    Current Facility-Administered Medications   Medication     lactated ringers infusion     lidocaine (LMX4) cream     lidocaine 1 % 0.1-1 mL     ondansetron (ZOFRAN) injection 4 mg     sodium chloride (PF) 0.9% PF flush 3 mL     sodium chloride (PF) 0.9% PF flush 3 mL     No current facility-administered medications on file prior to encounter.  No current outpatient medications on file prior to encounter.      PRE-SEDATION ASSESSMENT:    BP (!) 143/88   Pulse 63   Temp 97.8  F (36.6  C) (Temporal)   Resp 16   Wt 87.5 kg  (193 lb)   SpO2 96%   BMI 33.39 kg/m    Lung Exam:  Normal  Heart Exam:  Normal    Comment(s):      IMPRESSION:  Dysphagia, need for screening colonoscopy.    PLAN:  I discussed diagnostic EGD and screening colonoscopy with the patient.

## 2023-07-12 NOTE — DISCHARGE INSTRUCTIONS
Procedure you had done: EGD with biopsies and normal screening colonoscopy  Your health care provider is:  Clementina Cam  Your surgeon is Dr. Tere Flower.   Please call your health care provider or surgeon at [phone] if:    - you feel you are getting worse or having an increase in problems    - fever greater than 101 degrees  - increasing shortness of breath or chest pain  - any signs of infection (increasing redness, swelling, tenderness, warmth, change in appearance, or  increased drainage)  - blood in your urine or stool  - coughing or vomiting blood  - nausea (upset stomach) and vomiting and/or diarrhea that will not stop  - severe pain that is not relieved by medicine, rest or ice  You have had medications for sedation. Please be aware that this can cause drowsiness and impaired judgment for up to 24 hours after your procedure. Do not drive, operate power tools or drink alcohol for 24 hours.  If samples were taken-you will get a phone call and a letter with your results in the next 7-10 days. If you don't get results, please call and let us know!

## 2023-07-12 NOTE — OP NOTE
PROCEDURE NOTE    SURGEON:Tere Flower MD    PRE-OP DIAGNOSIS:   Dysphagia, need for screening colonoscopy      POST-OP DIAGNOSIS: dysphagia, diverticula-sigmoid colon    PROCEDURE PLANNED:   Diagnostic EGD      Screening Colonoscopy    PROCEDURE PERFORMED:  Flexible EGD with biopsies, screening colonoscopy    SPECIMEN:  Diverticula, antrum, GEJ biopsies    ANESTHESIA:  See anesthesia note    ESTIMATED BLOOD LOSS: none    COMPLICATIONS:  None    INDICATION FOR THE PROCEDURE: The patient is a 68 year old female. The patient presents with dysphagia and need for screening colonoscopy. I explained to the patient the risks, benefits and alternatives to diagnostic EGD and colonoscopy forevaluating her complaint. We specifically discussed the risks of bleeding, infection, perforation, potential inability to reach the cecum and the risks of sedation. The patient's questions were answered and the patient wished to proceed. Informed consent paperwork was completed.    PROCEDURE: The patient was taken to the endoscopy suite. Appropriate monitors were attached. The patient was placed in the left lateral decubitus position. Biteblock was positioned. Timeout was performed confirming the patient's identity and procedure to be performed. After appropriate sedation was confirmed, the flexible endoscope was advanced into the oropharynx. The posteriororopharynx appeared grossly normal. The scope was advanced into the proximal esophagus. The esophagus was insufflated with air. The scope was advanced under direct visualization. No acute abnormalities of the esophagus were noted. The scope was advanced into the stomach. The scope was advanced through the pylorus into the duodenal bulb. The bulb and distal duodenum appeared grossly normal. Biopsies were taken. The scope was withdrawn back into the stomach. Antral biopsy was obtained and sent to pathology. The scope was retroflexed and the GE junction inspected. No abnormalities were  noted. The scope was returned to a neutral position and the stomach was decompressed. The scope was withdrawn to the GE junction and biopsy obtained. The mucosa of the esophagus was inspected while withdrawing the scope. No abnormalities were noted. The scope was withdrawn from the patient. The bite block was removed.    The patient was repositioned. After appropriate sedation, digital rectal exam was performed.  There was normal tone and no gross abnormality was noted.  The lubricated flexible colonoscope was introduced into the anus the colon was insufflated with air. The prep quality was adequate. Under direct visualization the scope was advanced to the cecum. The ileocecal valve was intubated and the terminal ileum inspected. No gross abnormality was noted. The scope was withdrawn back into the cecum. The mucosa of colon was inspected while withdrawing the scope. No abnormalities were noted other than diverticula in sigmoid colon. The scope was retroflexed in the rectum and the anorectal junction was inspected. No abnormalities were noted. The scope was returned to a neutral position and the colon was decompressed. The scope was removed. The patient tolerated the procedure with no immediately apparent complication. The patient was taken to recovery in stable condition.    FOLLOW UP:  RECOMMENDATIONS will call with pathology results. High fiber diet. 10 year screening colonoscopy.

## 2023-07-12 NOTE — ANESTHESIA POSTPROCEDURE EVALUATION
Patient: Debra Heart    Procedure: Procedure(s):  Esophagoscopy, gastroscopy, duodenoscopy (EGD), combined  Colonoscopy       Anesthesia Type:  MAC    Note:  Disposition: Outpatient   Postop Pain Control: Uneventful            Sign Out: Well controlled pain   PONV: No   Neuro/Psych: Uneventful            Sign Out: Acceptable/Baseline neuro status   Airway/Respiratory: Uneventful            Sign Out: Acceptable/Baseline resp. status   CV/Hemodynamics: Uneventful            Sign Out: Acceptable CV status; No obvious hypovolemia; No obvious fluid overload   Other NRE: NONE   DID A NON-ROUTINE EVENT OCCUR?            Last vitals:  Vitals Value Taken Time   /73 07/12/23 1045   Temp 97.3  F (36.3  C) 07/12/23 1022   Pulse 58 07/12/23 1045   Resp 20 07/12/23 1045   SpO2 96 % 07/12/23 1100       Electronically Signed By: FITO Pabon CRNA  July 12, 2023  1:48 PM

## 2023-07-12 NOTE — OR NURSING
Patient was discharged home with friend via w/c.   Discharge instructions were reviewed with patient . And she verbalized understanding.   Prescriptions: none

## 2023-07-14 LAB
PATH REPORT.COMMENTS IMP SPEC: NORMAL
PATH REPORT.FINAL DX SPEC: NORMAL
PATH REPORT.RELEVANT HX SPEC: NORMAL
PHOTO IMAGE: NORMAL

## 2023-07-16 NOTE — RESULT ENCOUNTER NOTE
Sherrill/Wendy/Chel-Please call patient and let them know that their EGD showed no abnormalities. There was no sign of infection or inflammation. They should see their primary care provider for further issues. They should call with questions or concerns. Thanks!

## 2023-10-06 NOTE — NURSING NOTE
Addended by: Jagjit Bermudez on: 10/6/2023 09:45 AM     Modules accepted: Orders Patient presents to the clinic for kidney stone. Patient states the pain started around 10am this morning. Patient states she has dealt with these in the past.  Tay Ragsdale ..............10/11/2018 10:51 AM

## 2023-10-10 ENCOUNTER — OFFICE VISIT (OUTPATIENT)
Dept: FAMILY MEDICINE | Facility: OTHER | Age: 68
End: 2023-10-10
Attending: NURSE PRACTITIONER
Payer: MEDICARE

## 2023-10-10 ENCOUNTER — HOSPITAL ENCOUNTER (OUTPATIENT)
Dept: GENERAL RADIOLOGY | Facility: OTHER | Age: 68
Discharge: HOME | End: 2023-10-10
Payer: MEDICARE

## 2023-10-10 VITALS
HEIGHT: 64 IN | BODY MASS INDEX: 34.11 KG/M2 | SYSTOLIC BLOOD PRESSURE: 130 MMHG | WEIGHT: 199.8 LBS | HEART RATE: 78 BPM | DIASTOLIC BLOOD PRESSURE: 80 MMHG | TEMPERATURE: 98.5 F | OXYGEN SATURATION: 96 % | RESPIRATION RATE: 20 BRPM

## 2023-10-10 DIAGNOSIS — J45.20 MILD INTERMITTENT REACTIVE AIRWAY DISEASE WITHOUT COMPLICATION: ICD-10-CM

## 2023-10-10 DIAGNOSIS — J02.9 ACUTE PHARYNGITIS, UNSPECIFIED ETIOLOGY: ICD-10-CM

## 2023-10-10 DIAGNOSIS — J06.9 VIRAL URI WITH COUGH: Primary | ICD-10-CM

## 2023-10-10 DIAGNOSIS — R07.89 CHEST TIGHTNESS: ICD-10-CM

## 2023-10-10 LAB
FLUAV RNA SPEC QL NAA+PROBE: NEGATIVE
FLUBV RNA RESP QL NAA+PROBE: NEGATIVE
GROUP A STREP BY PCR: NOT DETECTED
RSV RNA SPEC NAA+PROBE: NEGATIVE
SARS-COV-2 RNA RESP QL NAA+PROBE: NEGATIVE

## 2023-10-10 PROCEDURE — 87637 SARSCOV2&INF A&B&RSV AMP PRB: CPT | Mod: ZL

## 2023-10-10 PROCEDURE — 99214 OFFICE O/P EST MOD 30 MIN: CPT

## 2023-10-10 PROCEDURE — G0463 HOSPITAL OUTPT CLINIC VISIT: HCPCS

## 2023-10-10 PROCEDURE — 87651 STREP A DNA AMP PROBE: CPT | Mod: ZL

## 2023-10-10 PROCEDURE — C9803 HOPD COVID-19 SPEC COLLECT: HCPCS

## 2023-10-10 PROCEDURE — G0463 HOSPITAL OUTPT CLINIC VISIT: HCPCS | Mod: 25

## 2023-10-10 PROCEDURE — 71046 X-RAY EXAM CHEST 2 VIEWS: CPT

## 2023-10-10 RX ORDER — ALBUTEROL SULFATE 90 UG/1
2 AEROSOL, METERED RESPIRATORY (INHALATION) EVERY 6 HOURS PRN
Qty: 18 G | Refills: 0 | Status: SHIPPED | OUTPATIENT
Start: 2023-10-10 | End: 2023-12-01

## 2023-10-10 RX ORDER — BENZONATATE 200 MG/1
200 CAPSULE ORAL 3 TIMES DAILY PRN
Qty: 21 CAPSULE | Refills: 0 | Status: SHIPPED | OUTPATIENT
Start: 2023-10-10 | End: 2023-12-01

## 2023-10-10 ASSESSMENT — PAIN SCALES - GENERAL: PAINLEVEL: EXTREME PAIN (8)

## 2023-10-10 NOTE — NURSING NOTE
"Chief Complaint   Patient presents with    Cough    chest congestion    Headache   Patient presents to clinic for feeling unwell since Saturday. Symptoms include cough, chest congestion with tightness, and chills. She took a COVID test last night and it was negative.      Shanae Black on 10/10/2023 at 10:41 AM        Initial /80   Pulse 78   Temp 98.5  F (36.9  C) (Tympanic)   Resp 20   Ht 1.613 m (5' 3.5\")   Wt 90.6 kg (199 lb 12.8 oz)   SpO2 96%   Breastfeeding No   BMI 34.84 kg/m   Estimated body mass index is 34.84 kg/m  as calculated from the following:    Height as of this encounter: 1.613 m (5' 3.5\").    Weight as of this encounter: 90.6 kg (199 lb 12.8 oz).       FOOD SECURITY SCREENING QUESTIONS:    The next two questions are to help us understand your food security.  If you are feeling you need any assistance in this area, we have resources available to support you today.    Hunger Vital Signs:  Within the past 12 months we worried whether our food would run out before we got money to buy more. Never  Within the past 12 months the food we bought just didn't last and we didn't have money to get more. Never      Shanae Black     "

## 2023-10-10 NOTE — PROGRESS NOTES
ASSESSMENT/PLAN:    (J06.9) Viral URI with cough  (primary encounter diagnosis); (R07.89) Chest tightness; (J02.9) Acute pharyngitis, unspecified etiology; (J45.20) Mild intermittent reactive airway disease without complication  Comment: Has been feeling unwell for the past 3 to 4 days.  She has a cough with chest congestion and tightness in her chest.  She has felt chills as well.  Home COVID test was negative.  On exam vital signs are stable and bilateral breath sounds are clear.  Because she is having concerns with chest congestion and tightness in her chest and x-ray was obtained and was negative for any acute cardiopulmonary process.  Multiplex testing was negative for COVID, RSV, and influenza.  She did request a strep test for a sore throat which was negative as well.  At this time antibiotics are not indicated as symptoms are likely viral in nature.  I did advise symptomatic care and close follow-up if symptoms worsen or she has any concerns.  With the chest tightness we did treat with an inhaler for reactive airway.  Plan: XR Chest 2 Views, Symptomatic Influenza A/B,         RSV, & SARS-CoV2 PCR (COVID-19) Nose,         benzonatate (TESSALON) 200 MG capsule        Group A Streptococcus PCR Throat Swab        albuterol (PROAIR HFA/PROVENTIL HFA/VENTOLIN         HFA) 108 (90 Base) MCG/ACT inhaler  Symptomatic treatment - Encouraged fluids, salt water gargles, honey (only if greater than 1 year in age due to risk of botulism), elevation, humidifier, sinus rinse/netti pot, lozenges, tea, topical vapor rub, popsicles, rest, etc     Discussed warning signs/symptoms indicative of need to f/u    Follow up if symptoms persist or worsen or concerns    I have reviewed the nursing notes.  I have reviewed the findings, diagnosis, plan and need for follow up with the patient.    I explained my diagnostic considerations and recommendations to the patient, who voiced understanding and agreement with the treatment plan. All  questions were answered. We discussed potential side effects of any prescribed or recommended therapies, as well as expectations for response to treatments.    VALENTINA IBARRA ALANA, FITO CNP  10/10/2023  10:45 AM    HPI:    Debra Heart is a 68 year old female  who presents to [facility] today for concerns of cough, chest congestion, headache.     She has been feeling unwell for 3 days.  She has had a cough with chest congestion and tightness in her chest.  She felt chills.  Home COVID test negative.    PCP: CAMILO Cam    Allergy: Clindamycin, Tramadol    Past Medical History:   Diagnosis Date    Allergic contact dermatitis due to plants, except food     Recurring poison ivy    Calculus of kidney     Kidney stones (calcium).    Enthesopathy     Right great toe bone spur.    Menstrual migraine without status migrainosus, not intractable     Menstrual migraine    Other intervertebral disc displacement, lumbar region     1999,Herniated disc (lumbar) x's two.     Past Surgical History:   Procedure Laterality Date    1ST MTP FUSION, 2ND HAMMERTOE REPAIR Right 10/29/2013    ARTHROSCOPY KNEE  1996    Bilateral arthroscopy with lateral releases in 1996 and 1997.    COLONOSCOPY  01/01/2010 2010,Due 2020    COLONOSCOPY N/A 7/12/2023    Procedure: Colonoscopy;  Surgeon: Tere Flower MD;  Location:  OR    ESOPHAGOSCOPY, GASTROSCOPY, DUODENOSCOPY (EGD), COMBINED N/A 7/12/2023    Procedure: Esophagoscopy, gastroscopy, duodenoscopy (EGD), combined;  Surgeon: Tere Flower MD;  Location:  OR    LAPAROSCOPIC TUBAL LIGATION      25    MAMMOPLASTY REDUCTION  1993    SLING BLADDER SUSPENSION WITH FASCIA SHARA  2010    TOE SURGERY      x2    TONSILLECTOMY      22     Social History     Tobacco Use    Smoking status: Never    Smokeless tobacco: Never   Substance Use Topics    Alcohol use: Yes     Alcohol/week: 0.8 standard drinks of alcohol     Comment: Alcoholic Drinks/day: occasionally/socially     Current Outpatient Medications  "  Medication Sig Dispense Refill    albuterol (PROAIR HFA/PROVENTIL HFA/VENTOLIN HFA) 108 (90 Base) MCG/ACT inhaler Inhale 2 puffs into the lungs every 6 hours as needed for shortness of breath, wheezing or cough 18 g 0    benzonatate (TESSALON) 200 MG capsule Take 1 capsule (200 mg) by mouth 3 times daily as needed for cough 21 capsule 0    naproxen (NAPROSYN) 500 MG tablet TAKE 1 TABLET BY MOUTH TWICE DAILY WITH MEALS FOR  5  TO  7  DAYS,  THEN  AS  NEEDED  THEREAFTER 60 tablet 2     Allergies   Allergen Reactions    Clindamycin Anaphylaxis    Tramadol Rash     Past medical history, past surgical history, current medications and allergies reviewed and accurate to the best of my knowledge.      ROS:  Refer to HPI    /80   Pulse 78   Temp 98.5  F (36.9  C) (Tympanic)   Resp 20   Ht 1.613 m (5' 3.5\")   Wt 90.6 kg (199 lb 12.8 oz)   SpO2 96%   Breastfeeding No   BMI 34.84 kg/m      EXAM:  General Appearance: Well appearing 68 year old female, appropriate appearance for age. No acute distress   Ears: Left TM intact, translucent with bony landmarks appreciated, no erythema, no effusion, no bulging, no purulence.  Right TM intact, translucent with bony landmarks appreciated, no erythema, no effusion, no bulging, no purulence.  Left auditory canal clear.  Right auditory canal clear.  Normal external ears, non tender.  Eyes: conjunctivae normal without erythema or irritation, corneas clear, no drainage or crusting, no eyelid swelling, pupils equal   Oropharynx: moist mucous membranes, posterior pharynx with erythema,  no exudates or petechiae, no post nasal drip seen, no trismus, voice clear.    Sinuses:  No sinus tenderness upon palpation of the frontal or maxillary sinuses  Nose:  Bilateral nares: no erythema, no edema, no drainage or congestion   Neck: supple without adenopathy  Respiratory: normal chest wall and respirations.  Normal effort.  Clear to auscultation bilaterally, no wheezing, crackles or " rhonchi.  No increased work of breathing.  No cough appreciated.  Cardiac: RRR with no murmurs  Abdomen: soft, nontender, no rigidity, no rebound tenderness or guarding, normal bowel sounds present  Musculoskeletal:  Equal movement of bilateral upper extremities.  Equal movement of bilateral lower extremities.  Normal gait.    Dermatological: no rashes noted of exposed skin  Neuro: Alert and oriented to person, place, and time.  Cranial nerves II-XII grossly intact with no focal or lateralizing deficits.  Muscle tone normal.  Gait normal. No tremor.   Psychological: normal affect, alert, oriented, and pleasant.     Labs/XR:  Results for orders placed or performed in visit on 10/10/23   XR Chest 2 Views     Status: None    Narrative    PROCEDURE:  XR CHEST 2 VIEWS    HISTORY: Acute cough; Chest tightness, .    COMPARISON:  None.    FINDINGS:  The cardiomediastinal contours are normal. The trachea is midline.  No focal consolidation, effusion or pneumothorax.    No suspicious osseous lesion or subdiaphragmatic free air.      Impression    IMPRESSION:      No focal consolidation.      VIVIAN LOCKE MD         SYSTEM ID:  [id number]   Symptomatic Influenza A/B, RSV, & SARS-CoV2 PCR (COVID-19) Nose     Status: Normal    Specimen: Nose; Swab   Result Value Ref Range    Influenza A PCR Negative Negative    Influenza B PCR Negative Negative    RSV PCR Negative Negative    SARS CoV2 PCR Negative Negative    Narrative    Testing was performed using the Xpert Xpress CoV2/Flu/RSV Assay on the [employer] GeneXpert Instrument. This test should be ordered for the detection of SARS-CoV-2, influenza, and RSV viruses in individuals who meet clinical and/or epidemiological criteria. Test performance is unknown in asymptomatic patients. This test is for in vitro diagnostic use under the FDA EUA for laboratories certified under CLIA to perform high or moderate complexity testing. This test has not been FDA cleared or approved. A  negative result does not rule out the presence of PCR inhibitors in the specimen or target RNA in concentration below the limit of detection for the assay. If only one viral target is positive but coinfection with multiple targets is suspected, the sample should be re-tested with another FDA cleared, approved, or authorized test, if coinfection would change clinical management. This test was validated by the [facility]. These laboratories are certified under the Clinical Laboratory Improvement Amendments of 1988 (CLIA-88) as qualified to perform high complexity laboratory testing.   Group A Streptococcus PCR Throat Swab     Status: Normal    Specimen: Throat; Swab   Result Value Ref Range    Group A strep by PCR Not Detected Not Detected    Narrative    The Xpert Xpress Strep A test, performed on the [employer]  Instrument Systems, is a rapid, qualitative in vitro diagnostic test for the detection of Streptococcus pyogenes (Group A ß-hemolytic Streptococcus, Strep A) in throat swab specimens from patients with signs and symptoms of pharyngitis. The Xpert Xpress Strep A test can be used as an aid in the diagnosis of Group A Streptococcal pharyngitis. The assay is not intended to monitor treatment for Group A Streptococcus infections. The Xpert Xpress Strep A test utilizes an automated real-time polymerase chain reaction (PCR) to detect Streptococcus pyogenes DNA.

## 2023-12-01 ENCOUNTER — OFFICE VISIT (OUTPATIENT)
Dept: FAMILY MEDICINE | Facility: OTHER | Age: 68
End: 2023-12-01
Attending: FAMILY MEDICINE
Payer: MEDICARE

## 2023-12-01 VITALS
SYSTOLIC BLOOD PRESSURE: 132 MMHG | HEART RATE: 74 BPM | RESPIRATION RATE: 18 BRPM | WEIGHT: 205 LBS | TEMPERATURE: 97.8 F | OXYGEN SATURATION: 98 % | BODY MASS INDEX: 35.74 KG/M2 | DIASTOLIC BLOOD PRESSURE: 80 MMHG

## 2023-12-01 DIAGNOSIS — G89.29 CHRONIC PAIN OF BOTH KNEES: Primary | ICD-10-CM

## 2023-12-01 DIAGNOSIS — G57.10 MERALGIA PARESTHETICA, UNSPECIFIED LATERALITY: ICD-10-CM

## 2023-12-01 DIAGNOSIS — M25.562 CHRONIC PAIN OF BOTH KNEES: Primary | ICD-10-CM

## 2023-12-01 DIAGNOSIS — M25.561 CHRONIC PAIN OF BOTH KNEES: Primary | ICD-10-CM

## 2023-12-01 PROCEDURE — 99213 OFFICE O/P EST LOW 20 MIN: CPT | Performed by: FAMILY MEDICINE

## 2023-12-01 PROCEDURE — G0463 HOSPITAL OUTPT CLINIC VISIT: HCPCS

## 2023-12-01 RX ORDER — RESPIRATORY SYNCYTIAL VIRUS VACCINE 120MCG/0.5
0.5 KIT INTRAMUSCULAR ONCE
Qty: 1 EACH | Refills: 0 | Status: CANCELLED | OUTPATIENT
Start: 2023-12-01 | End: 2023-12-01

## 2023-12-01 ASSESSMENT — ASTHMA QUESTIONNAIRES: ACT_TOTALSCORE: 25

## 2023-12-01 ASSESSMENT — PAIN SCALES - GENERAL: PAINLEVEL: MODERATE PAIN (4)

## 2023-12-01 NOTE — PROGRESS NOTES
Nursing Notes:   Migdalia Yazmin HARMAN RYAN  12/1/2023  8:55 AM  Sign at exiting of workspace  Chief Complaint   Patient presents with    Pain     Bilat knee          Medication Reconciliation: complete    Yazmin RYANHerber Negron JOYCECHRISTA Richardson is a 68 year old, presenting for the following health issues:  Pain (Bilat knee )        12/1/2023     8:54 AM   Additional Questions   Roomed by Yazmin Negron     Had been walking on tile in her crocs and stepped on water.  Slipped and her left leg slid out and she fully flexed her right knee.  Her right knee is bothering her more now in her calf.  Feels very tight and hurts.  She has a history of a baker's cyst in her right calf.    She is also wondering if she tore her left meniscus.  She had previously torn her right meniscus.  She had arthroscopies in both knees in 1981.  She previously worked at [employer] and had to squat a lot.  Hasn't had any surgeries since then.  Her left knee is catching, but a little better.  Using a horse linement that is like bengay on both knees.  Neither are buckling.  If she steps on uneven ground, has had a pain in her posterior left knee and medially.      Has had anterolateral thigh numbness at times.  History of Present Illness       Reason for visit:  Hurt my left and right knee  Symptom onset:  1-2 weeks ago  Symptoms include:  Pain and limping  Symptom intensity:  Severe  Symptom progression:  Improving  Had these symptoms before:  No  What makes it worse:  Stepping on uneven ground  What makes it better:  Ice,rubbing pain lniment on them    She eats 2-3 servings of fruits and vegetables daily.She consumes 0 sweetened beverage(s) daily.She exercises with enough effort to increase her heart rate 30 to 60 minutes per day.  She exercises with enough effort to increase her heart rate 5 days per week.   She is taking medications regularly.         Pain History:  When did you first notice your pain? See above   Have you seen anyone else for  your pain? No  How has your pain affected your ability to work? 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  knees          Review of Systems   Constitutional, HEENT, cardiovascular, pulmonary, GI, , musculoskeletal, neuro, skin, endocrine and psych systems are negative, except as otherwise noted.      Objective    /80   Pulse 74   Temp 97.8  F (36.6  C) (Tympanic)   Resp 18   Wt 93 kg (205 lb)   SpO2 98%   Breastfeeding No   BMI 35.74 kg/m    Body mass index is 35.74 kg/m .  Physical Exam  Constitutional:       Appearance: Normal appearance.   Musculoskeletal:      Comments: Knees:  effusions noted bilaterally.  Right knee with some tenderness posteriorly in her proximal calf.  Negative anterior/posterior drawer tests bilaterally.  Positive Boy's bilaterally.      Assessment & Plan     ICD-10-CM    1. Chronic pain of both knees  M25.561 MR Knee Right w/o Contrast    M25.562 MR Knee Left w/o Contrast    G89.29       2. Meralgia paresthetica, unspecified laterality  G57.10          By history, symptoms are suspicious for meniscus tears.  She has a prior known history of meniscus tear in her right knee.  Suspect she also could have ruptured Baker's cyst in her right knee as well.  She wanted to get updated MRIs of both knees to assess interval changes.  These are ordered.    By history, this numbness is likely related to meralgia paresthetica.  Discussed weight loss and wearing loosefitting clothing would be advised.    She declines hepatitis C screening, Tdap vaccine, Shingrix vaccine, RSV vaccine, flu and COVID vaccines as well.    No follow-ups on file.    Dulce Maria Diaz MD  [facility]

## 2023-12-01 NOTE — NURSING NOTE
Chief Complaint   Patient presents with    Pain     Bilat knee          Medication Reconciliation: complete    Yazmin Negron, LPN

## 2023-12-01 NOTE — LETTER
My Asthma Action Plan    Name: Debra Heart   YOB: 1955  Date: 12/2/2023   My doctor: Dulce Maria Diaz MD   My clinic: [facility]        My Rescue Medicine:   none My Asthma Severity:   Intermittent / Exercise Induced  Know your asthma triggers: Patient is unaware of triggers             GREEN ZONE   Good Control  I feel good  No cough or wheeze  Can work, sleep and play without asthma symptoms       Take your asthma control medicine every day.     If exercise triggers your asthma, take your rescue medication  15 minutes before exercise or sports, and  During exercise if you have asthma symptoms  Spacer to use with inhaler: If you have a spacer, make sure to use it with your inhaler             YELLOW ZONE Getting Worse  I have ANY of these:  I do not feel good  Cough or wheeze  Chest feels tight  Wake up at night   Keep taking your Green Zone medications  Start taking your rescue medicine:  every 20 minutes for up to 1 hour. Then every 4 hours for 24-48 hours.  If you stay in the Yellow Zone for more than 12-24 hours, contact your doctor.  If you do not return to the Green Zone in 12-24 hours or you get worse, start taking your oral steroid medicine if prescribed by your provider.           RED ZONE Medical Alert - Get Help  I have ANY of these:  I feel awful  Medicine is not helping  Breathing getting harder  Trouble walking or talking  Nose opens wide to breathe       Take your rescue medicine NOW  If your provider has prescribed an oral steroid medicine, start taking it NOW  Call your doctor NOW  If you are still in the Red Zone after 20 minutes and you have not reached your doctor:  Take your rescue medicine again and  Call 911 or go to the emergency room right away    See your regular doctor within 2 weeks of an Emergency Room or Urgent Care visit for follow-up treatment.          Annual Reminders:  Meet with Asthma Educator,  Flu Shot in the Fall, consider  Pneumonia Vaccination for patients with asthma (aged 19 and older).    Pharmacy: [id number] - [place], [place] - [address]    Electronically signed by Dulce Maria Diaz MD   Date: 12/02/23                    Asthma Triggers  How To Control Things That Make Your Asthma Worse    Triggers are things that make your asthma worse.  Look at the list below to help you find your triggers and   what you can do about them. You can help prevent asthma flare-ups by staying away from your triggers.      Trigger                                                          What you can do   Cigarette Smoke  Tobacco smoke can make asthma worse. Do not allow smoking in your home, car or around you.  Be sure no one smokes at a child s day care or school.  If you smoke, ask your health care provider for ways to help you quit.  Ask family members to quit too.  Ask your health care provider for a referral to Quit Plan to help you quit smoking, or call 8-844-078-PLAN.     Colds, Flu, Bronchitis  These are common triggers of asthma. Wash your hands often.  Don t touch your eyes, nose or mouth.  Get a flu shot every year.     Dust Mites  These are tiny bugs that live in cloth or carpet. They are too small to see. Wash sheets and blankets in hot water every week.   Encase pillows and mattress in dust mite proof covers.  Avoid having carpet if you can. If you have carpet, vacuum weekly.   Use a dust mask and HEPA vacuum.   Pollen and Outdoor Mold  Some people are allergic to trees, grass, or weed pollen, or molds. Try to keep your windows closed.  Limit time out doors when pollen count is high.   Ask you health care provider about taking medicine during allergy season.     Animal Dander  Some people are allergic to skin flakes, urine or saliva from pets with fur or feathers. Keep pets with fur or feathers out of your home.    If you can t keep the pet outdoors, then keep the pet out of your bedroom.  Keep the bedroom  door closed.  Keep pets off cloth furniture and away from stuffed toys.     Mice, Rats, and Cockroaches  Some people are allergic to the waste from these pests.   Cover food and garbage.  Clean up spills and food crumbs.  Store grease in the refrigerator.   Keep food out of the bedroom.   Indoor Mold  This can be a trigger if your home has high moisture. Fix leaking faucets, pipes, or other sources of water.   Clean moldy surfaces.  Dehumidify basement if it is damp and smelly.   Smoke, Strong Odors, and Sprays  These can reduce air quality. Stay away from strong odors and sprays, such as perfume, powder, hair spray, paints, smoke incense, paint, cleaning products, candles and new carpet.   Exercise or Sports  Some people with asthma have this trigger. Be active!  Ask your doctor about taking medicine before sports or exercise to prevent symptoms.    Warm up for 5-10 minutes before and after sports or exercise.     Other Triggers of Asthma  Cold air:  Cover your nose and mouth with a scarf.  Sometimes laughing or crying can be a trigger.  Some medicines and food can trigger asthma.

## 2023-12-26 ENCOUNTER — HOSPITAL ENCOUNTER (OUTPATIENT)
Dept: MRI IMAGING | Facility: OTHER | Age: 68
Discharge: HOME | End: 2023-12-26
Attending: FAMILY MEDICINE
Payer: MEDICARE

## 2023-12-26 DIAGNOSIS — M25.562 CHRONIC PAIN OF BOTH KNEES: ICD-10-CM

## 2023-12-26 DIAGNOSIS — G89.29 CHRONIC PAIN OF BOTH KNEES: ICD-10-CM

## 2023-12-26 DIAGNOSIS — M25.561 CHRONIC PAIN OF BOTH KNEES: ICD-10-CM

## 2023-12-26 PROCEDURE — G1010 CDSM STANSON: HCPCS

## 2023-12-28 DIAGNOSIS — S83.242D ACUTE MEDIAL MENISCUS TEAR OF LEFT KNEE, SUBSEQUENT ENCOUNTER: Primary | ICD-10-CM

## 2024-01-22 DIAGNOSIS — G89.29 CHRONIC PAIN OF RIGHT KNEE: ICD-10-CM

## 2024-01-22 DIAGNOSIS — M25.561 CHRONIC PAIN OF RIGHT KNEE: ICD-10-CM

## 2024-01-22 DIAGNOSIS — M17.11 ARTHRITIS OF RIGHT KNEE: ICD-10-CM

## 2024-01-22 NOTE — TELEPHONE ENCOUNTER
Reason for call: Medication or medication refill    Name of medication requested: naproxen    How many days of medication do you have left? 1 week     What pharmacy do you use? [facility]    Preferred method for responding to this message: Telephone Call    Phone number patient can be reached at: Cell number on file:    Telephone Information:   Mobile [phone]       If we cannot reach you directly, may we leave a detailed response at the number you provided?  NA     Prescription originally filled by [facility] on 12/01/23 for knee pain. Patient would like a refill.       Deidre Brannon on 1/22/2024 at 1:02 PM

## 2024-01-22 NOTE — TELEPHONE ENCOUNTER
Naproxyn not on current med list.    Patient states that she saw Dr. Dejon Wiley for her knee pain.    Patient states she continues to take Naproxen 500 mg tablet takes 1-2 tablets daily as needed.    Set med up as patient requested .  Patient states she would like Rx to go to Dr. Dejon Wiley.    Oralia Gregory RN on 1/22/2024 at 1:26 PM

## 2024-01-23 RX ORDER — NAPROXEN 500 MG/1
TABLET ORAL
Qty: 60 TABLET | Refills: 2 | Status: SHIPPED | OUTPATIENT
Start: 2024-01-23 | End: 2024-05-08

## 2024-01-24 DIAGNOSIS — J06.9 VIRAL URI WITH COUGH: Primary | ICD-10-CM

## 2024-01-24 RX ORDER — ALBUTEROL SULFATE 90 UG/1
2 AEROSOL, METERED RESPIRATORY (INHALATION) EVERY 6 HOURS PRN
Qty: 18 G | Status: CANCELLED | OUTPATIENT
Start: 2024-01-24

## 2024-01-25 RX ORDER — BENZONATATE 200 MG/1
200 CAPSULE ORAL 3 TIMES DAILY PRN
Qty: 21 CAPSULE | Refills: 0 | Status: SHIPPED | OUTPATIENT
Start: 2024-01-25 | End: 2024-06-18

## 2024-01-25 NOTE — TELEPHONE ENCOUNTER
Called and left message for patient to return call to verify request for medications.  Meds not on current med list.  Oralia Gregory RN on 1/25/2024 at 3:16 PM

## 2024-01-25 NOTE — TELEPHONE ENCOUNTER
Patient returned call.  Patient states that she had an Rx for Benzonatate capsules from a cold that she had back in October.    Patient states that she developed a cough about 5 days ago and had some Benzonatate capsules left so has been taking them and they really help with her cough.    Patient states she tested negative for COVID.   is coughing up yellowish/green phlegm.  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   has been taking pepto bismol to coat her throat    Denies fever, shortness of breath.  Patient states she does not need refill on inhaler  Oralia Gregory RN on 1/25/2024 at 3:50 PM

## 2024-01-30 ENCOUNTER — OFFICE VISIT (OUTPATIENT)
Dept: ORTHOPEDICS | Facility: OTHER | Age: 69
End: 2024-01-30
Attending: FAMILY MEDICINE
Payer: MEDICARE

## 2024-01-30 VITALS — BODY MASS INDEX: 34.7 KG/M2 | WEIGHT: 199 LBS | OXYGEN SATURATION: 97 % | HEART RATE: 98 BPM

## 2024-01-30 DIAGNOSIS — M71.22 BAKER'S CYST OF KNEE, LEFT: ICD-10-CM

## 2024-01-30 DIAGNOSIS — S83.242D ACUTE MEDIAL MENISCUS TEAR OF LEFT KNEE, SUBSEQUENT ENCOUNTER: ICD-10-CM

## 2024-01-30 PROCEDURE — 99203 OFFICE O/P NEW LOW 30 MIN: CPT | Performed by: ORTHOPAEDIC SURGERY

## 2024-01-30 PROCEDURE — G0463 HOSPITAL OUTPT CLINIC VISIT: HCPCS

## 2024-01-30 ASSESSMENT — PAIN SCALES - GENERAL: PAINLEVEL: MODERATE PAIN (4)

## 2024-01-30 NOTE — PROGRESS NOTES
Surgical Clinic Consult  Primary physician:     Clementina Cam    Chief complaint:   Bilateral knee pain    History of present illness:  This is a 68 year old female I am seeing in consultation for bilateral knee pain with left side being more symptomatic than right.  She reports the right side resolved after rupturing her cyst with a deep squat type move.  Left side now is causing increased symptomology and had MRI scan done on both sides which did show some degenerative medial meniscal tearing and then Baker's cyst more on the left side than the right.  Patient is here to explore options.  Patient would like to consider aspiration of the left knee cyst as noted.  When she does get tenderness she is also noting this on the medial aspect of both joints.    Past medical history:   Past Medical History:   Diagnosis Date    Allergic contact dermatitis due to plants, except food     Recurring poison ivy    Calculus of kidney     Kidney stones (calcium).    Enthesopathy     Right great toe bone spur.    Menstrual migraine without status migrainosus, not intractable     Menstrual migraine    Other intervertebral disc displacement, lumbar region     1999,Herniated disc (lumbar) x's two.       Pastsurgical history:  Past Surgical History:   Procedure Laterality Date    1ST MTP FUSION, 2ND HAMMERTOE REPAIR Right 10/29/2013    ARTHROSCOPY KNEE  1996    Bilateral arthroscopy with lateral releases in 1996 and 1997.    COLONOSCOPY  01/01/2010 2010,Due 2020    COLONOSCOPY N/A 7/12/2023    Procedure: Colonoscopy;  Surgeon: Tere Flower MD;  Location:  OR    ESOPHAGOSCOPY, GASTROSCOPY, DUODENOSCOPY (EGD), COMBINED N/A 7/12/2023    Procedure: Esophagoscopy, gastroscopy, duodenoscopy (EGD), combined;  Surgeon: Tere Flower MD;  Location:  OR    LAPAROSCOPIC TUBAL LIGATION      25    MAMMOPLASTY REDUCTION  1993    SLING BLADDER SUSPENSION WITH FASCIA SHARA  2010    TOE SURGERY      x2    TONSILLECTOMY      24       Current  medications:  Current Outpatient Medications   Medication Sig Dispense Refill    benzonatate (TESSALON) 200 MG capsule Take 1 capsule (200 mg) by mouth 3 times daily as needed for cough 21 capsule 0    naproxen (NAPROSYN) 500 MG tablet TAKE 1 TABLET BY MOUTH TWICE DAILY WITH MEALS . 60 tablet 2       Allergies:  Allergies   Allergen Reactions    Clindamycin Anaphylaxis    Tramadol Rash       Family history:  Family History   Problem Relation Age of Onset    Hypertension Father     Kidney failure Father     Irritable Bowel Syndrome Sister     Hypertension Mother     Heart Disease Mother         Heart Disease, replaced heart valve    Arthritis Mother         Arthritis,Rheumatoid arthritis    Other - See Comments Sister         Acute intermittent porphyria, kidney transplant    Heart Disease Maternal Grandfather         Heart Disease, of diabetes    Breast Cancer Paternal Grandmother         Cancer-breast       Social history:  Social History     Socioeconomic History    Marital status:      Spouse name: Not on file    Number of children: Not on file    Years of education: Not on file    Highest education level: Not on file   Occupational History    Not on file   Tobacco Use    Smoking status: Never    Smokeless tobacco: Never   Vaping Use    Vaping Use: Never used   Substance and Sexual Activity    Alcohol use: Yes     Alcohol/week: 0.8 standard drinks of alcohol     Comment: Alcoholic Drinks/day: occasionally/socially    Drug use: No    Sexual activity: Not Currently     Partners: Male   Other Topics Concern    Parent/sibling w/ CABG, MI or angioplasty before 65F 55M? Not Asked   Social History Narrative    , owner of [employer], also raises Labradors.         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      Social Determinants of Health     Financial Resource Strain: Low Risk  (2023)    Financial Resource Strain     Within the past 12 months, have you or  your family members you live with been unable to get utilities (heat, electricity) when it was really needed?: No   Food Insecurity: Low Risk  (12/1/2023)    Food Insecurity     Within the past 12 months, did you worry that your food would run out before you got money to buy more?: No     Within the past 12 months, did the food you bought just not last and you didn t have money to get more?: No   Transportation Needs: Low Risk  (12/1/2023)    Transportation Needs     Within the past 12 months, has lack of transportation kept you from medical appointments, getting your medicines, non-medical meetings or appointments, work, or from getting things that you need?: No   Physical Activity: Not on file   Stress: Not on file   Social Connections: Not on file   Interpersonal Safety: Low Risk  (12/1/2023)    Interpersonal Safety     Do you feel physically and emotionally safe where you currently live?: Yes     Within the past 12 months, have you been hit, slapped, kicked or otherwise physically hurt by someone?: No     Within the past 12 months, have you been humiliated or emotionally abused in other ways by your partner or ex-partner?: No   Housing Stability: Low Risk  (12/1/2023)    Housing Stability     Do you have housing? : Yes     Are you worried about losing your housing?: No       PROBLEM LIST:  Patient Active Problem List   Diagnosis    Other hyperlipidemia    Overweight    ACP (advance care planning)    History of kidney stones    Chronic right-sided low back pain, with sciatica presence unspecified    Displacement of lumbar intervertebral disc without myelopathy       Review of Systems:  COMPLETE 12 point REVIEW OF SYSTEMS is otherwise negative with the exception of which is stated above.    Physical exam: Pulse 98   Wt 90.3 kg (199 lb)   SpO2 97%   BMI 34.70 kg/m      General: this is a pleasant female patient in no acute distress.  Patient is awake alert and oriented x3 .   EXAM:  Chest/Respiratory Exam:  Normal - Clear to auscultation without rales, rhonchi, or wheezing.  Cardiovascular Exam: normal  Musculoskeletal: Bilateral knee examination does show some mild tenderness palpation across the medial joint line.  Crepitation with flexion extension is noted.  Baker's cyst more prominent on the left knee than the right.  Full range of motion is present.  Knee Tracking is also acceptable.    Imaging: MRI scans do show degenerative medial meniscal tear but stable in nature bilaterally.  Baker's cyst present on the left knee as well in addition to that.    Assessment:   Bilateral knee osteoarthrosis mild approaching moderate with general medial meniscal tearing left side with popliteal cyst    Plan:    Conservative management advised at this time.  Aspiration to be recommended for her left knee as well.  Should can symptoms continue to persist consideration of arthroscopic debridement.  At this time however I do not think she necessary needs to be in hurry for that.      Jarrod Salinas MD

## 2024-03-07 ENCOUNTER — HOSPITAL ENCOUNTER (OUTPATIENT)
Dept: ULTRASOUND IMAGING | Facility: OTHER | Age: 69
Discharge: HOME | End: 2024-03-07
Attending: ORTHOPAEDIC SURGERY | Admitting: ORTHOPAEDIC SURGERY
Payer: MEDICARE

## 2024-03-07 VITALS
DIASTOLIC BLOOD PRESSURE: 85 MMHG | HEART RATE: 84 BPM | SYSTOLIC BLOOD PRESSURE: 138 MMHG | TEMPERATURE: 97.5 F | RESPIRATION RATE: 16 BRPM | OXYGEN SATURATION: 100 %

## 2024-03-07 DIAGNOSIS — M71.22 BAKER'S CYST OF KNEE, LEFT: ICD-10-CM

## 2024-03-07 DIAGNOSIS — S83.242D ACUTE MEDIAL MENISCUS TEAR OF LEFT KNEE, SUBSEQUENT ENCOUNTER: ICD-10-CM

## 2024-03-07 PROCEDURE — 250N000011 HC RX IP 250 OP 636: Performed by: RADIOLOGY

## 2024-03-07 PROCEDURE — 20611 DRAIN/INJ JOINT/BURSA W/US: CPT | Mod: LT

## 2024-03-07 PROCEDURE — 250N000009 HC RX 250: Performed by: RADIOLOGY

## 2024-03-07 RX ORDER — DEXAMETHASONE SODIUM PHOSPHATE 10 MG/ML
10 INJECTION, SOLUTION INTRAMUSCULAR; INTRAVENOUS ONCE
Status: COMPLETED | OUTPATIENT
Start: 2024-03-07 | End: 2024-03-07

## 2024-03-07 RX ADMIN — DEXAMETHASONE SODIUM PHOSPHATE 5 MG: 10 INJECTION, SOLUTION INTRAMUSCULAR; INTRAVENOUS at 09:03

## 2024-03-07 RX ADMIN — LIDOCAINE HYDROCHLORIDE 6.5 ML: 10 INJECTION, SOLUTION INFILTRATION; PERINEURAL at 09:03

## 2024-03-07 NOTE — PROGRESS NOTES
Patient is here for US guided Left knee aspiration and injection    Prior to the start of the procedure, with procedural staff and physician participation, I verbally confirmed the patient s identity using two indicators, relevant allergies, that the procedure was appropriate and matched the consent or emergent situation, and that the correct equipment/implants were available. Immediately prior to starting the procedure I conducted the Time Out with the procedural staff and re-confirmed the patient s name, procedure, and site/side. (The Joint Commission universal protocol was followed.)  Yes.    Patient describes pain as: Aching in the knee on the left side. Pain is present: continuously.     Pain pre-procedure: 3/10 on pain scale.  Pain post-procedure: 3/10 on pain scale.     This writer will call patient in approximately 1 week to assess benefit of these injections. Discharge instructions reviewed with patient. Discharged home.

## 2024-03-07 NOTE — DISCHARGE INSTRUCTIONS
US guided knee injection and joint aspiration    Pain relief may be noted immediately. Duration of relief is variable. It may take 2-3 days for the steroid to begin to work.       Activity:   After your procedure take it easy for the rest of the day.  You should avoid heavy work or strenuous exercise for the first   few days. You can normally get back to work the next day.      Care of site:   Leave the bandage on until this time tomorrow.  Watch for signs/symptoms of infection such as redness, swelling,   or drainage from the needle site.     Comfort:  You may feel sore for a couple of days until the steroid begins to work. You may have some bruising at the injection site.   Continue taking your usual pain medication until you feel the benefit from injections.       Call your doctor: if fever over 101 degrees, increased redness, increased swelling, persistent drainage, foul smelling drainage, or    increased pain at injection site.      If you have any questions, you can reach the [facility] Radiology Department at [phone]. Let the person who answers know  that you had an injection by a radiologist. Alternatively, you can reach the Imaging Nurse at [phone] Monday-Friday   8:00 am - 4:30 pm.

## 2024-03-14 ENCOUNTER — TELEPHONE (OUTPATIENT)
Dept: GENERAL RADIOLOGY | Facility: OTHER | Age: 69
End: 2024-03-14
Payer: MEDICARE

## 2024-03-14 NOTE — TELEPHONE ENCOUNTER
Call placed to patient to follow-up after recent radiology procedure.     Pain level prior to procedure (from procedure notes): 3    Pain level immediately following the procedure (from procedure notes): 3    Pain in the first 7 days following the procedure: improving    Location of pain: knee    Characteristics: achey    Frequency: imtermittent    Pain scale today: 1/10 on pain scale    Do you feel that there has been a general improvement in symptoms since your injections? Yes    Patient encouraged to follow-up with primary care provider for any new or ongoing concerns/needs.    Patient verbalized understanding of how to reach appointment desk to schedule future appointments and how to leave message for primary care provider.     Viktoria Roland RN on 3/14/2024 at 3:32 PM            Self

## 2024-05-03 DIAGNOSIS — M17.11 ARTHRITIS OF RIGHT KNEE: ICD-10-CM

## 2024-05-03 DIAGNOSIS — G89.29 CHRONIC PAIN OF RIGHT KNEE: ICD-10-CM

## 2024-05-03 DIAGNOSIS — M25.561 CHRONIC PAIN OF RIGHT KNEE: ICD-10-CM

## 2024-05-04 ENCOUNTER — HEALTH MAINTENANCE LETTER (OUTPATIENT)
Age: 69
End: 2024-05-04

## 2024-05-08 RX ORDER — NAPROXEN 500 MG/1
TABLET ORAL
Qty: 60 TABLET | Refills: 0 | Status: SHIPPED | OUTPATIENT
Start: 2024-05-08 | End: 2024-06-10

## 2024-05-08 NOTE — TELEPHONE ENCOUNTER
[facility] sent Rx request for the following:      Requested Prescriptions   Pending Prescriptions Disp Refills    naproxen (NAPROSYN) 500 MG tablet [Pharmacy Med Name: Naproxen 500 MG Oral Tablet] 60 tablet 0     Sig: TAKE 1 TABLET BY MOUTH TWICE DAILY WITH MEALS       NSAID Medications Failed - 5/8/2024  4:12 PM        Failed - Normal ALT on file in past 12 months     Recent Labs   Lab Test 08/24/20  1128 02/28/18  1335 12/13/17  1048   ALT 31   < >  --    GICHALT  --   --  64*    < > = values in this interval not displayed.             Failed - Normal AST on file in past 12 months     Recent Labs   Lab Test 08/24/20  1128 02/28/18  1335 12/13/17  1048   AST 25   < >  --    GICHAST  --   --  36    < > = values in this interval not displayed.             Failed - Patient is age 6-64 years        Failed - Normal CBC on file in past 12 months     Recent Labs   Lab Test 03/28/23  0925   WBC 3.8*   RBC 4.85   HGB 14.3   HCT 42.2                    Failed - Always Fail Criteria - Chart Review Required     Validate Diagnosis. If the medication is requested for an acute flare of a chronic pain associated with a musculoskeletal or rheumatologic condition; okay to authorize if all other criteria are met. If not, then forward to provider for review.          Failed - Normal GFR on file in past 12 months     Recent Labs   Lab Test 03/28/23  0925 08/24/20  1128   GFRESTIMATED >90 82   GFRESTBLACK  --  >90             Failed - Normal serum creatinine on file in past 12 months     Recent Labs   Lab Test 03/28/23  0925   CR 0.65                Last Prescription Date:   1/23/24  Last Fill Qty/Refills:         60, R-2    Last Office Visit:              5/8/23   Future Office visit:              Routing refill request to provider for review/approval because:  Patient needs to be seen because it has been more than 1 year since last office visit.    Will route to unit 4 scheduling to call patient and help assist in scheduling  appointment.  Oralia Gregory RN on 5/8/2024 at 4:14 PM

## 2024-05-09 ENCOUNTER — TELEPHONE (OUTPATIENT)
Dept: FAMILY MEDICINE | Facility: OTHER | Age: 69
End: 2024-05-09
Payer: MEDICARE

## 2024-05-09 DIAGNOSIS — Z12.31 ENCOUNTER FOR SCREENING MAMMOGRAM FOR BREAST CANCER: Primary | ICD-10-CM

## 2024-06-07 DIAGNOSIS — G89.29 CHRONIC PAIN OF RIGHT KNEE: ICD-10-CM

## 2024-06-07 DIAGNOSIS — M17.11 ARTHRITIS OF RIGHT KNEE: ICD-10-CM

## 2024-06-07 DIAGNOSIS — M25.561 CHRONIC PAIN OF RIGHT KNEE: ICD-10-CM

## 2024-06-10 RX ORDER — NAPROXEN 500 MG/1
TABLET ORAL
Qty: 60 TABLET | Refills: 0 | Status: SHIPPED | OUTPATIENT
Start: 2024-06-10 | End: 2024-07-19

## 2024-06-18 ENCOUNTER — HOSPITAL ENCOUNTER (OUTPATIENT)
Dept: MAMMOGRAPHY | Facility: OTHER | Age: 69
Discharge: HOME | End: 2024-06-18
Attending: PHYSICIAN ASSISTANT
Payer: MEDICARE

## 2024-06-18 ENCOUNTER — OFFICE VISIT (OUTPATIENT)
Dept: FAMILY MEDICINE | Facility: OTHER | Age: 69
End: 2024-06-18
Attending: PHYSICIAN ASSISTANT
Payer: MEDICARE

## 2024-06-18 VITALS
HEIGHT: 64 IN | TEMPERATURE: 96.3 F | BODY MASS INDEX: 34.25 KG/M2 | DIASTOLIC BLOOD PRESSURE: 76 MMHG | HEART RATE: 58 BPM | OXYGEN SATURATION: 98 % | SYSTOLIC BLOOD PRESSURE: 130 MMHG | RESPIRATION RATE: 14 BRPM | WEIGHT: 200.6 LBS

## 2024-06-18 DIAGNOSIS — Z00.00 ENCOUNTER FOR MEDICARE ANNUAL WELLNESS EXAM: Primary | ICD-10-CM

## 2024-06-18 DIAGNOSIS — E66.811 CLASS 1 OBESITY WITHOUT SERIOUS COMORBIDITY WITH BODY MASS INDEX (BMI) OF 34.0 TO 34.9 IN ADULT, UNSPECIFIED OBESITY TYPE: ICD-10-CM

## 2024-06-18 DIAGNOSIS — E78.49 OTHER HYPERLIPIDEMIA: ICD-10-CM

## 2024-06-18 DIAGNOSIS — Z12.31 ENCOUNTER FOR SCREENING MAMMOGRAM FOR BREAST CANCER: ICD-10-CM

## 2024-06-18 LAB
ALBUMIN SERPL BCG-MCNC: 4.6 G/DL (ref 3.5–5.2)
ALP SERPL-CCNC: 124 U/L (ref 40–150)
ALT SERPL W P-5'-P-CCNC: 32 U/L (ref 0–50)
ANION GAP SERPL CALCULATED.3IONS-SCNC: 10 MMOL/L (ref 7–15)
AST SERPL W P-5'-P-CCNC: 25 U/L (ref 0–45)
BASOPHILS # BLD AUTO: 0 10E3/UL (ref 0–0.2)
BASOPHILS NFR BLD AUTO: 1 %
BILIRUB SERPL-MCNC: 0.6 MG/DL
BUN SERPL-MCNC: 18.5 MG/DL (ref 8–23)
CALCIUM SERPL-MCNC: 9.8 MG/DL (ref 8.8–10.2)
CHLORIDE SERPL-SCNC: 103 MMOL/L (ref 98–107)
CHOLEST SERPL-MCNC: 236 MG/DL
CREAT SERPL-MCNC: 0.66 MG/DL (ref 0.51–0.95)
DEPRECATED HCO3 PLAS-SCNC: 27 MMOL/L (ref 22–29)
EGFRCR SERPLBLD CKD-EPI 2021: >90 ML/MIN/1.73M2
EOSINOPHIL # BLD AUTO: 0.1 10E3/UL (ref 0–0.7)
EOSINOPHIL NFR BLD AUTO: 2 %
ERYTHROCYTE [DISTWIDTH] IN BLOOD BY AUTOMATED COUNT: 12.2 % (ref 10–15)
FASTING STATUS PATIENT QL REPORTED: YES
FASTING STATUS PATIENT QL REPORTED: YES
GLUCOSE SERPL-MCNC: 107 MG/DL (ref 70–99)
HBA1C MFR BLD: 6 % (ref 4–6.2)
HCT VFR BLD AUTO: 41 % (ref 35–47)
HDLC SERPL-MCNC: 44 MG/DL
HGB BLD-MCNC: 13.5 G/DL (ref 11.7–15.7)
IMM GRANULOCYTES # BLD: 0 10E3/UL
IMM GRANULOCYTES NFR BLD: 0 %
LDLC SERPL CALC-MCNC: 132 MG/DL
LYMPHOCYTES # BLD AUTO: 2.3 10E3/UL (ref 0.8–5.3)
LYMPHOCYTES NFR BLD AUTO: 42 %
MCH RBC QN AUTO: 29.2 PG (ref 26.5–33)
MCHC RBC AUTO-ENTMCNC: 32.9 G/DL (ref 31.5–36.5)
MCV RBC AUTO: 89 FL (ref 78–100)
MONOCYTES # BLD AUTO: 0.4 10E3/UL (ref 0–1.3)
MONOCYTES NFR BLD AUTO: 7 %
NEUTROPHILS # BLD AUTO: 2.7 10E3/UL (ref 1.6–8.3)
NEUTROPHILS NFR BLD AUTO: 48 %
NONHDLC SERPL-MCNC: 192 MG/DL
NRBC # BLD AUTO: 0 10E3/UL
NRBC BLD AUTO-RTO: 0 /100
PLATELET # BLD AUTO: 236 10E3/UL (ref 150–450)
POTASSIUM SERPL-SCNC: 4.1 MMOL/L (ref 3.4–5.3)
PROT SERPL-MCNC: 7.3 G/DL (ref 6.4–8.3)
RBC # BLD AUTO: 4.63 10E6/UL (ref 3.8–5.2)
SODIUM SERPL-SCNC: 140 MMOL/L (ref 135–145)
TRIGL SERPL-MCNC: 301 MG/DL
TSH SERPL DL<=0.005 MIU/L-ACNC: 1.08 UIU/ML (ref 0.3–4.2)
WBC # BLD AUTO: 5.5 10E3/UL (ref 4–11)

## 2024-06-18 PROCEDURE — 77063 BREAST TOMOSYNTHESIS BI: CPT

## 2024-06-18 PROCEDURE — 36415 COLL VENOUS BLD VENIPUNCTURE: CPT | Mod: ZL | Performed by: PHYSICIAN ASSISTANT

## 2024-06-18 PROCEDURE — 84443 ASSAY THYROID STIM HORMONE: CPT | Mod: ZL | Performed by: PHYSICIAN ASSISTANT

## 2024-06-18 PROCEDURE — G0463 HOSPITAL OUTPT CLINIC VISIT: HCPCS | Mod: 25

## 2024-06-18 PROCEDURE — 99213 OFFICE O/P EST LOW 20 MIN: CPT | Mod: 25 | Performed by: PHYSICIAN ASSISTANT

## 2024-06-18 PROCEDURE — 85025 COMPLETE CBC W/AUTO DIFF WBC: CPT | Mod: ZL | Performed by: PHYSICIAN ASSISTANT

## 2024-06-18 PROCEDURE — 82247 BILIRUBIN TOTAL: CPT | Mod: ZL | Performed by: PHYSICIAN ASSISTANT

## 2024-06-18 PROCEDURE — 80061 LIPID PANEL: CPT | Mod: ZL | Performed by: PHYSICIAN ASSISTANT

## 2024-06-18 PROCEDURE — G0439 PPPS, SUBSEQ VISIT: HCPCS | Performed by: PHYSICIAN ASSISTANT

## 2024-06-18 PROCEDURE — 83036 HEMOGLOBIN GLYCOSYLATED A1C: CPT | Mod: ZL | Performed by: PHYSICIAN ASSISTANT

## 2024-06-18 PROCEDURE — G0463 HOSPITAL OUTPT CLINIC VISIT: HCPCS

## 2024-06-18 SDOH — HEALTH STABILITY: PHYSICAL HEALTH: ON AVERAGE, HOW MANY DAYS PER WEEK DO YOU ENGAGE IN MODERATE TO STRENUOUS EXERCISE (LIKE A BRISK WALK)?: 7 DAYS

## 2024-06-18 ASSESSMENT — PAIN SCALES - GENERAL: PAINLEVEL: MODERATE PAIN (4)

## 2024-06-18 ASSESSMENT — ASTHMA QUESTIONNAIRES
QUESTION_4 LAST FOUR WEEKS HOW OFTEN HAVE YOU USED YOUR RESCUE INHALER OR NEBULIZER MEDICATION (SUCH AS ALBUTEROL): NOT AT ALL
QUESTION_1 LAST FOUR WEEKS HOW MUCH OF THE TIME DID YOUR ASTHMA KEEP YOU FROM GETTING AS MUCH DONE AT WORK, SCHOOL OR AT HOME: NONE OF THE TIME
ACT_TOTALSCORE: 25
ACT_TOTALSCORE: 25
QUESTION_5 LAST FOUR WEEKS HOW WOULD YOU RATE YOUR ASTHMA CONTROL: COMPLETELY CONTROLLED
QUESTION_2 LAST FOUR WEEKS HOW OFTEN HAVE YOU HAD SHORTNESS OF BREATH: NOT AT ALL
QUESTION_3 LAST FOUR WEEKS HOW OFTEN DID YOUR ASTHMA SYMPTOMS (WHEEZING, COUGHING, SHORTNESS OF BREATH, CHEST TIGHTNESS OR PAIN) WAKE YOU UP AT NIGHT OR EARLIER THAN USUAL IN THE MORNING: NOT AT ALL

## 2024-06-18 ASSESSMENT — SOCIAL DETERMINANTS OF HEALTH (SDOH): HOW OFTEN DO YOU GET TOGETHER WITH FRIENDS OR RELATIVES?: MORE THAN THREE TIMES A WEEK

## 2024-06-18 NOTE — NURSING NOTE
Patient presents to clinic for Medicare wellness visit.  Alicia Carrion LPN ....................  6/18/2024   10:03 AM  EXT [id number]

## 2024-06-18 NOTE — PROGRESS NOTES
"Preventive Care Visit  [facility] AND \[facility]\""  Clementina Cam PA-C, Family Medicine  Jun 18, 2024      Assessment & Plan       1. Encounter for Medicare annual wellness exam  Up-to-date on preventative exams and immunizations.  Lab work pending as below.  - CBC and Differential; Future  - Comprehensive Metabolic Panel; Future  - Lipid Panel; Future  - Hemoglobin A1c; Future  - TSH Reflex GH; Future  - CBC and Differential  - Comprehensive Metabolic Panel  - Lipid Panel  - Hemoglobin A1c  - TSH Reflex [facility]    2. Other hyperlipidemia  Lipid panel pending.  Will notify with results and adjust treatment if indicated.    3. Class 1 obesity without serious comorbidity with body mass index (BMI) of 34.0 to 34.9 in adult, unspecified obesity type  Encourage to continue to focus on healthy lifestyle.    BMI  Estimated body mass index is 34.7 kg/m  as calculated from the following:    Height as of this encounter: 1.619 m (5' 3.75\").    Weight as of this encounter: 91 kg (200 lb 9.6 oz).       Counseling  Appropriate preventive services were discussed with this patient, including applicable screening as appropriate for fall prevention, nutrition, physical activity, Tobacco-use cessation, weight loss and cognition.  Checklist reviewing preventive services available has been given to the patient.  Reviewed patient's diet, addressing concerns and/or questions.   The patient was instructed to see the dentist every 6 months.       No follow-ups on file.    Timmy Richardson is a 68 year old, presenting for the following:  Medicare Visit    Health Care Directive  Patient has a Health Care Directive on file  Discussed advance care planning with patient.    HPI    Contraception: Tubal  Risk for STI?: No  FH of early CA?: No  Cholesterol/DM concerns/screening: Due  Tobacco?: No  Calcium intake: Dietary  DEXA: 06/2023- osteopenia  Last mammo: 06/18  Colonoscopy: 07/2023  Immunizations: UTD        6/18/2024   General Health "   How would you rate your overall physical health? Good   Feel stress (tense, anxious, or unable to sleep) Not at all         6/18/2024   Nutrition   Diet: Regular (no restrictions)         6/18/2024   Exercise   Days per week of moderate/strenous exercise 7 days         6/18/2024   Social Factors   Frequency of gathering with friends or relatives More than three times a week   Worry food won't last until get money to buy more No   Food not last or not have enough money for food? No   Do you have housing?  Yes   Are you worried about losing your housing? No   Lack of transportation? No   Unable to get utilities (heat,electricity)? No         6/18/2024   Fall Risk   Fallen 2 or more times in the past year? No    No   Trouble with walking or balance? No    No          6/18/2024   Activities of Daily Living- Home Safety   Needs help with the following daily activites None of the above   Safety concerns in the home None of the above         6/18/2024   Dental   Dentist two times every year? (!) NO         6/18/2024   Hearing Screening   Hearing concerns? None of the above         6/18/2024   Driving Risk Screening   Patient/family members have concerns about driving No         6/18/2024   General Alertness/Fatigue Screening   Have you been more tired than usual lately? No         6/18/2024   Urinary Incontinence Screening   Bothered by leaking urine in past 6 months No         6/18/2024   TB Screening   Were you born outside of the [place]? No         Today's PHQ-2 Score:       6/18/2024    10:08 AM   PHQ-2 ( 1999 Pf[employer])   Q1: Little interest or pleasure in doing things 0   Q2: Feeling down, depressed or hopeless 0   PHQ-2 Score 0   Q1: Little interest or pleasure in doing things Not at all   Q2: Feeling down, depressed or hopeless Not at all   PHQ-2 Score 0           6/18/2024   Substance Use   Alcohol more than 3/day or more than 7/wk No   Do you have a current opioid prescription? No   How severe/bad is pain from 1 to  10? 4/10   Do you use any other substances recreationally? No     Social History     Tobacco Use    Smoking status: Never    Smokeless tobacco: Never   Vaping Use    Vaping status: Never Used   Substance Use Topics    Alcohol use: Yes     Alcohol/week: 0.8 standard drinks of alcohol     Comment: Alcoholic Drinks/day: occasionally/socially    Drug use: No           4/28/2023   LAST FHS-7 RESULTS   1st degree relative breast or ovarian cancer No   Any relative bilateral breast cancer No   Any male have breast cancer No   Any ONE woman have BOTH breast AND ovarian cancer No   Any woman with breast cancer before 50yrs No   2 or more relatives with breast AND/OR ovarian cancer No   2 or more relatives with breast AND/OR bowel cancer No        Mammogram Screening - Mammogram every 1-2 years updated in Health Maintenance based on mutual decision making      History of abnormal Pap smear: YES - reflected in Problem List and Health Maintenance accordingly        Latest Ref Rng & Units 8/24/2020    10:31 AM   PAP / HPV   PAP (Historical)  NIL    HPV 16 DNA NEG^Negative Negative    HPV 18 DNA NEG^Negative Negative    Other HR HPV NEG^Negative Negative      ASCVD Risk   The 10-year ASCVD risk score (Anshul LAO, et al., 2019) is: 7.5%    Values used to calculate the score:      Age: 68 years      Sex: Female      Is Non- : No      Diabetic: No      Tobacco smoker: No      Systolic Blood Pressure: 130 mmHg      Is BP treated: No      HDL Cholesterol: 44 mg/dL      Total Cholesterol: 236 mg/dL          Reviewed and updated as needed this visit by Provider                    Past Medical History:   Diagnosis Date    Allergic contact dermatitis due to plants, except food     Recurring poison ivy    Calculus of kidney     Kidney stones (calcium).    Enthesopathy     Right great toe bone spur.    Menstrual migraine without status migrainosus, not intractable     Menstrual migraine    Other  intervertebral disc displacement, lumbar region     1999,Herniated disc (lumbar) x's two.     Past Surgical History:   Procedure Laterality Date    1ST MTP FUSION, 2ND HAMMERTOE REPAIR Right 10/29/2013    ARTHROSCOPY KNEE  1996    Bilateral arthroscopy with lateral releases in 1996 and 1997.    COLONOSCOPY  01/01/2010 2010,Due 2020    COLONOSCOPY N/A 7/12/2023    Procedure: Colonoscopy;  Surgeon: Tere Flower MD;  Location: [facility] OR    ESOPHAGOSCOPY, GASTROSCOPY, DUODENOSCOPY (EGD), COMBINED N/A 7/12/2023    Procedure: Esophagoscopy, gastroscopy, duodenoscopy (EGD), combined;  Surgeon: Tere Flower MD;  Location: [facility] OR    LAPAROSCOPIC TUBAL LIGATION      23    MAMMOPLASTY REDUCTION  1993    SLING BLADDER SUSPENSION WITH FASCIA SHARA  2010    TOE SURGERY      x2    TONSILLECTOMY      22     Current providers sharing in care for this patient include:  Patient Care Team:  Clementina Cam PA-C as PCP - General (Physician Assistant)  Clementina Cam PA-C as Assigned PCP  Jarrod Salinas MD as Assigned Musculoskeletal Provider    The following health maintenance items are reviewed in Epic and correct as of today:  Health Maintenance   Topic Date Due    RSV VACCINE (Pregnancy & 60+) (1 - 1-dose 60+ series) Never done    COVID-19 Vaccine (4 - 2023-24 season) 09/01/2023    MEDICARE ANNUAL WELLNESS VISIT  03/28/2024    ZOSTER IMMUNIZATION (1 of 2) 12/01/2024 (Originally 6/22/2007)    DTAP/TDAP/TD IMMUNIZATION (1 - Tdap) 12/01/2024 (Originally 2/8/2006)    INFLUENZA VACCINE (Season Ended) 09/01/2024    ASTHMA ACTION PLAN  12/02/2024    ASTHMA CONTROL TEST  12/18/2024    LIPID  06/18/2025    FALL RISK ASSESSMENT  06/18/2025    MAMMO SCREENING  06/18/2026    GLUCOSE  06/18/2027    ADVANCE CARE PLANNING  06/18/2029    COLORECTAL CANCER SCREENING  07/12/2033    DEXA  06/02/2038    PHQ-2 (once per calendar year)  Completed    Pneumococcal Vaccine: 65+ Years  Completed    IPV IMMUNIZATION  Aged Out    HPV IMMUNIZATION  Aged  "Out    MENINGITIS IMMUNIZATION  Aged Out    RSV MONOCLONAL ANTIBODY  Aged Out    HEPATITIS C SCREENING  Discontinued    PAP  Discontinued         Review of Systems  Constitutional, HEENT, cardiovascular, pulmonary, gi and gu systems are negative, except as otherwise noted.     Objective    Exam  /76   Pulse 58   Temp (!) 96.3  F (35.7  C)   Resp 14   Ht 1.619 m (5' 3.75\")   Wt 91 kg (200 lb 9.6 oz)   SpO2 98%   BMI 34.70 kg/m     Estimated body mass index is 34.7 kg/m  as calculated from the following:    Height as of this encounter: 1.619 m (5' 3.75\").    Weight as of this encounter: 91 kg (200 lb 9.6 oz).    Physical Exam  GENERAL: alert and no distress  EYES: Eyes grossly normal to inspection, PERRL and conjunctivae and sclerae normal  RESP: lungs clear to auscultation - no rales, rhonchi or wheezes  CV: regular rate and rhythm, normal S1 S2, no S3 or S4, no murmur, click or rub, no peripheral edema  ABDOMEN: soft, nontender, no hepatosplenomegaly, no masses and bowel sounds normal  MS: no gross musculoskeletal defects noted, no edema  SKIN: no suspicious lesions or rashes  PSYCH: mentation appears normal, affect normal/bright         6/18/2024   Mini Cog   Clock Draw Score 2 Normal    2 Normal   3 Item Recall 3 objects recalled    3 objects recalled   Mini Cog Total Score 5    5         Signed Electronically by: Clementina Cam PA-C    "

## 2024-07-13 DIAGNOSIS — G89.29 CHRONIC PAIN OF RIGHT KNEE: ICD-10-CM

## 2024-07-13 DIAGNOSIS — M25.561 CHRONIC PAIN OF RIGHT KNEE: ICD-10-CM

## 2024-07-13 DIAGNOSIS — M17.11 ARTHRITIS OF RIGHT KNEE: ICD-10-CM

## 2024-07-19 RX ORDER — NAPROXEN 500 MG/1
TABLET ORAL
Qty: 60 TABLET | Refills: 0 | Status: SHIPPED | OUTPATIENT
Start: 2024-07-19 | End: 2024-08-23

## 2024-07-19 NOTE — TELEPHONE ENCOUNTER
[id number] sent Rx request for the following:      Requested Prescriptions   Pending Prescriptions Disp Refills    naproxen (NAPROSYN) 500 MG tablet [Pharmacy Med Name: Naproxen 500 MG Oral Tablet] 60 tablet 0     Sig: TAKE 1 TABLET BY MOUTH TWICE DAILY WITH MEALS       NSAID Medications Failed - 7/19/2024  9:18 AM        Failed - Patient is age 6-64 years        Failed - Always Fail Criteria - Chart Review Required     Last Prescription Date:   6/10/24  Last Fill Qty/Refills:         60, R-0    Last Office Visit:              6/18/24   Future Office visit:           None    Unable to complete prescription refill per RN Medication Refill Policy. Teresa Easton RN .............. 7/19/2024  9:18 AM

## 2024-08-22 DIAGNOSIS — M25.561 CHRONIC PAIN OF RIGHT KNEE: ICD-10-CM

## 2024-08-22 DIAGNOSIS — M17.11 ARTHRITIS OF RIGHT KNEE: ICD-10-CM

## 2024-08-22 DIAGNOSIS — G89.29 CHRONIC PAIN OF RIGHT KNEE: ICD-10-CM

## 2024-08-23 RX ORDER — NAPROXEN 500 MG/1
TABLET ORAL
Qty: 60 TABLET | Refills: 0 | Status: SHIPPED | OUTPATIENT
Start: 2024-08-23 | End: 2024-09-30

## 2024-08-23 NOTE — TELEPHONE ENCOUNTER
[facility] sent Rx request for the following:      Requested Prescriptions   Pending Prescriptions Disp Refills    naproxen (NAPROSYN) 500 MG tablet [Pharmacy Med Name: Naproxen 500 MG Oral Tablet] 60 tablet 0     Sig: TAKE 1 TABLET BY MOUTH TWICE DAILY WITH MEALS       NSAID Medications Failed - 8/23/2024 10:26 AM        Failed - Patient is age 6-64 years        Failed - Always Fail Criteria - Chart Review Required     Validate Diagnosis. If the medication is requested for an acute flare of a chronic pain associated with a musculoskeletal or rheumatologic condition; okay to authorize if all other criteria are met. If not, then forward to provider for review.          Passed - Blood pressure under 140/90 in past 12 months     BP Readings from Last 3 Encounters:   06/18/24 130/76   03/07/24 138/85   12/01/23 132/80       No data recorded            Passed - Normal CBC on file in past 12 months     Recent Labs   Lab Test 06/18/24  1116   WBC 5.5   RBC 4.63   HGB 13.5   HCT 41.0                    Passed - Medication is active on med list        Passed - Normal GFR on file in past 12 months     Recent Labs   Lab Test 06/18/24  1116 03/28/23  0925 08/24/20  1128   GFRESTIMATED >90   < > 82   GFRESTBLACK  --   --  >90    < > = values in this interval not displayed.             Passed - Recent (12 mo) or future (90 days) visit within the authorizing provider's specialty     The patient must have completed an in-person or virtual visit within the past 12 months or has a future visit scheduled within the next 90 days with the authorizing provider s specialty.  Urgent care and e-visits do not quality as an office visit for this protocol.          Passed - No active pregnancy on record        Passed - No positive pregnancy test in past 12 months             Last Prescription Date:   7/19/24  Last Fill Qty/Refills:         60, R-30    Last Office Visit:              6/18/24   Future Office visit:            None    Unable to complete prescription refill per RN Medication Refill Policy.     Keagan Rodriguez RN on 8/23/2024 at 10:27 AM

## 2024-09-09 ENCOUNTER — OFFICE VISIT (OUTPATIENT)
Dept: FAMILY MEDICINE | Facility: OTHER | Age: 69
End: 2024-09-09
Attending: STUDENT IN AN ORGANIZED HEALTH CARE EDUCATION/TRAINING PROGRAM
Payer: MEDICARE

## 2024-09-09 VITALS
SYSTOLIC BLOOD PRESSURE: 121 MMHG | HEART RATE: 71 BPM | DIASTOLIC BLOOD PRESSURE: 76 MMHG | HEIGHT: 64 IN | TEMPERATURE: 97.8 F | OXYGEN SATURATION: 97 % | WEIGHT: 200.2 LBS | RESPIRATION RATE: 12 BRPM | BODY MASS INDEX: 34.18 KG/M2

## 2024-09-09 DIAGNOSIS — H57.89 IRRITATION OF LEFT EYE: Primary | ICD-10-CM

## 2024-09-09 PROCEDURE — G0463 HOSPITAL OUTPT CLINIC VISIT: HCPCS

## 2024-09-09 PROCEDURE — 250N000009 HC RX 250: Performed by: STUDENT IN AN ORGANIZED HEALTH CARE EDUCATION/TRAINING PROGRAM

## 2024-09-09 PROCEDURE — 99213 OFFICE O/P EST LOW 20 MIN: CPT | Performed by: STUDENT IN AN ORGANIZED HEALTH CARE EDUCATION/TRAINING PROGRAM

## 2024-09-09 RX ORDER — ERYTHROMYCIN 5 MG/G
0.5 OINTMENT OPHTHALMIC 4 TIMES DAILY
Qty: 3.5 G | Refills: 0 | Status: SHIPPED | OUTPATIENT
Start: 2024-09-09 | End: 2024-09-16

## 2024-09-09 RX ORDER — TETRACAINE HYDROCHLORIDE 5 MG/ML
1-2 SOLUTION OPHTHALMIC ONCE
Status: COMPLETED | OUTPATIENT
Start: 2024-09-09 | End: 2024-09-09

## 2024-09-09 RX ADMIN — FLUORESCEIN SODIUM 1 STRIP: 1 STRIP OPHTHALMIC at 12:24

## 2024-09-09 RX ADMIN — TETRACAINE HYDROCHLORIDE 2 DROP: 5 SOLUTION OPHTHALMIC at 12:25

## 2024-09-09 ASSESSMENT — PAIN SCALES - GENERAL: PAINLEVEL: MODERATE PAIN (4)

## 2024-09-09 NOTE — NURSING NOTE
"Chief Complaint   Patient presents with    Eye Problem     Wood particle in left eye.       Initial /76 (BP Location: Left arm, Patient Position: Sitting, Cuff Size: Adult Large)   Pulse 71   Temp 97.8  F (36.6  C) (Temporal)   Resp 12   Ht 1.619 m (5' 3.75\")   Wt 90.8 kg (200 lb 3.2 oz)   SpO2 97%   BMI 34.63 kg/m   Estimated body mass index is 34.63 kg/m  as calculated from the following:    Height as of this encounter: 1.619 m (5' 3.75\").    Weight as of this encounter: 90.8 kg (200 lb 3.2 oz).  Medication Review: complete    The next two questions are to help us understand your food security.  If you are feeling you need any assistance in this area, we have resources available to support you today.          6/18/2024   SDOH- Food Insecurity   Within the past 12 months, did you worry that your food would run out before you got money to buy more? N   Within the past 12 months, did the food you bought just not last and you didn t have money to get more? N            Health Care Directive:  Patient has a Health Care Directive on file      Jerel Beauchamp      "

## 2024-09-09 NOTE — PROGRESS NOTES
"  Assessment & Plan     (H57.89) Irritation of left eye  (primary encounter diagnosis)    Comment: Irritation of the left eye.  No obvious scratches or foreign bodies.  Area was flushed with normal saline, Q-tip was used to cleanse the inside of the upper eyelid with one particulate removed.  Per patient, last tetanus was completed in 2016.    Plan: fluorescein (FUL-VERONICA) ophthalmic strip 1 strip,        tetracaine (PONTOCAINE) 0.5 % ophthalmic         solution 1-2 drop, erythromycin (ROMYCIN) 5         MG/GM ophthalmic ointment    Plan to treat with erythromycin ointment 4-6 times a day for the next week.  Ophthalmology follow-up.  Return to rapid clinic or ER if symptoms worsen or change.  Patient is comfortable and agreeable with this plan.    Timmy Richardson is a 69 year old, presenting for the following health issues:  Eye Problem (Wood particle in left eye.)    HPI    Patient presents today with concerns of wood particulates in her eye.  She notes it is her left eye.  She was hauling somewhat a couple days ago when she noted that the wind blew some particulates into her eye.  She did try to flush the eye with water.  She notes that today it has been more red on the medial side of the eye.  She has not had any vision changes.  She does not wear glasses or contacts.      Review of Systems  Constitutional, HEENT, cardiovascular, pulmonary, gi and gu systems are negative, except as otherwise noted.        Objective    /76 (BP Location: Left arm, Patient Position: Sitting, Cuff Size: Adult Large)   Pulse 71   Temp 97.8  F (36.6  C) (Temporal)   Resp 12   Ht 1.619 m (5' 3.75\")   Wt 90.8 kg (200 lb 3.2 oz)   SpO2 97%   BMI 34.63 kg/m    Body mass index is 34.63 kg/m .    Physical Exam   GENERAL: alert and no distress  EYES: Left eye with moderate erythema on the medial side of the conjunctiva, right eye clear, PERRL and conjunctivae and sclerae normal, no obvious scratches, foreign bodies noted with " fluorescein dye exam, EOM intact without any difficulty  HENT: nose and mouth without ulcers or lesions  RESP: no increased work of breathing          Signed Electronically by: Janie Farley PA-C

## 2024-09-09 NOTE — PATIENT INSTRUCTIONS
Left Eye Irritation    Erythromycin ointment four - six times a day for one week.    Cool packs.    Follow up with ophthalmology if not improving.

## 2024-10-28 ENCOUNTER — TELEPHONE (OUTPATIENT)
Dept: FAMILY MEDICINE | Facility: OTHER | Age: 69
End: 2024-10-28
Payer: MEDICARE

## 2024-10-28 DIAGNOSIS — L23.7 CONTACT DERMATITIS DUE TO POISON IVY: Primary | ICD-10-CM

## 2024-10-28 RX ORDER — TRIAMCINOLONE ACETONIDE 1 MG/G
CREAM TOPICAL 2 TIMES DAILY
Qty: 45 G | Refills: 0 | Status: SHIPPED | OUTPATIENT
Start: 2024-10-28

## 2024-10-28 NOTE — TELEPHONE ENCOUNTER
Pt has poison ivy - needs to get a prescription for it.  Please call.    Chuck Hernandez on 10/28/2024 at 2:36 PM

## 2024-10-28 NOTE — TELEPHONE ENCOUNTER
S-(situation): Patient has poison Ivy from wood    B-(background): Patient has poison ivy in diffuse areas. States that it is draining yellow  exudate, extremely itchy, painful.     A-(assessment): Poison Shannan    R-(recommendations):  Will route to provider for further recommendations.  Jazzy Rush RN on 10/28/2024 at 3:08 PM

## 2024-10-28 NOTE — TELEPHONE ENCOUNTER
Low dose steroid cream sent. If symptoms persist or worsen needs to be seen.  Clementina Cam PA-C on 10/28/2024 at 3:09 PM

## 2024-11-01 ENCOUNTER — APPOINTMENT (OUTPATIENT)
Dept: GENERAL RADIOLOGY | Facility: OTHER | Age: 69
End: 2024-11-01
Payer: MEDICARE

## 2024-11-01 ENCOUNTER — HOSPITAL ENCOUNTER (EMERGENCY)
Facility: OTHER | Age: 69
Discharge: HOME | End: 2024-11-01
Payer: MEDICARE

## 2024-11-01 VITALS
SYSTOLIC BLOOD PRESSURE: 172 MMHG | HEART RATE: 78 BPM | RESPIRATION RATE: 18 BRPM | BODY MASS INDEX: 34.6 KG/M2 | DIASTOLIC BLOOD PRESSURE: 96 MMHG | WEIGHT: 200 LBS | OXYGEN SATURATION: 98 % | TEMPERATURE: 98 F

## 2024-11-01 DIAGNOSIS — M79.672 LEFT FOOT PAIN: ICD-10-CM

## 2024-11-01 DIAGNOSIS — M25.572 ACUTE LEFT ANKLE PAIN: ICD-10-CM

## 2024-11-01 DIAGNOSIS — M25.562 ACUTE PAIN OF BOTH KNEES: ICD-10-CM

## 2024-11-01 DIAGNOSIS — M25.561 ACUTE PAIN OF BOTH KNEES: ICD-10-CM

## 2024-11-01 DIAGNOSIS — M25.511 ACUTE PAIN OF RIGHT SHOULDER: ICD-10-CM
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
## 2024-11-01 PROCEDURE — 73562 X-RAY EXAM OF KNEE 3: CPT | Mod: LT

## 2024-11-01 PROCEDURE — 73030 X-RAY EXAM OF SHOULDER: CPT | Mod: RT

## 2024-11-01 PROCEDURE — 250N000013 HC RX MED GY IP 250 OP 250 PS 637

## 2024-11-01 PROCEDURE — 99283 EMERGENCY DEPT VISIT LOW MDM: CPT

## 2024-11-01 PROCEDURE — 99285 EMERGENCY DEPT VISIT HI MDM: CPT

## 2024-11-01 PROCEDURE — 73610 X-RAY EXAM OF ANKLE: CPT | Mod: LT

## 2024-11-01 PROCEDURE — 73630 X-RAY EXAM OF FOOT: CPT | Mod: LT

## 2024-11-01 RX ORDER — ACETAMINOPHEN 325 MG/1
975 TABLET ORAL ONCE
Status: COMPLETED | OUTPATIENT
Start: 2024-11-01 | End: 2024-11-01

## 2024-11-01 RX ADMIN — ACETAMINOPHEN 975 MG: 325 TABLET ORAL at 15:50

## 2024-11-01 RX ADMIN — IBUPROFEN 600 MG: 200 TABLET, FILM COATED ORAL at 15:50

## 2024-11-01 ASSESSMENT — COLUMBIA-SUICIDE SEVERITY RATING SCALE - C-SSRS
2. HAVE YOU ACTUALLY HAD ANY THOUGHTS OF KILLING YOURSELF IN THE PAST MONTH?: NO
1. IN THE PAST MONTH, HAVE YOU WISHED YOU WERE DEAD OR WISHED YOU COULD GO TO SLEEP AND NOT WAKE UP?: NO
6. HAVE YOU EVER DONE ANYTHING, STARTED TO DO ANYTHING, OR PREPARED TO DO ANYTHING TO END YOUR LIFE?: NO

## 2024-11-01 ASSESSMENT — ACTIVITIES OF DAILY LIVING (ADL)
ADLS_ACUITY_SCORE: 0

## 2024-11-01 NOTE — DISCHARGE INSTRUCTIONS
please make an appointment in the 1 week in clinic for re-evaluation. Boot if continued pain of ankle/foot. ice, elevation, tylenol, ibuprofen. (Knees, foot, ankle) to help with swelling, pain.   Return to be seen if new/worsening symptoms.   Take it easy! And move slowly and cautiously with boot on.

## 2024-11-01 NOTE — ED PROVIDER NOTES
History     Chief Complaint   Patient presents with    Fall     The history is provided by the patient and medical records.     Debra Heart is a 69 year old female who presents to the ER today independently, ambulatory. Patient was working in her skid stear this afternoon. She was stepping out of it when she caught her left foot causing her to fall forward, twisting her left ankle/knee. She hit the top of her head on on straw, did not have LOC, denies headache. She has pain now in her anterior ankle, left knee, right shoulder. Pain in left ankle with ambulation. She hasn't taken anything for pain prior to arrival. Otherwise healthy, no blood thinners.     Allergies:  Allergies   Allergen Reactions    Clindamycin Anaphylaxis    Tramadol Rash       Problem List:    Patient Active Problem List    Diagnosis Date Noted    Displacement of lumbar intervertebral disc without myelopathy 03/09/2021     Priority: Medium     Formatting of this note might be different from the original.  times two      History of kidney stones 02/04/2019     Priority: Medium    Chronic right-sided low back pain, with sciatica presence unspecified 02/04/2019     Priority: Medium    ACP (advance care planning) 03/05/2018     Priority: Medium    Overweight 10/03/2016     Priority: Medium    Other hyperlipidemia 12/31/2013     Priority: Medium        Past Medical History:    Past Medical History:   Diagnosis Date    Allergic contact dermatitis due to plants, except food     Calculus of kidney     Enthesopathy     Menstrual migraine without status migrainosus, not intractable     Other intervertebral disc displacement, lumbar region        Past Surgical History:    Past Surgical History:   Procedure Laterality Date    1ST MTP FUSION, 2ND HAMMERTOE REPAIR Right 10/29/2013    ARTHROSCOPY KNEE  1996    Bilateral arthroscopy with lateral releases in 1996 and 1997.    COLONOSCOPY  01/01/2010 2010,Due 2020    COLONOSCOPY N/A 7/12/2023     Procedure: Colonoscopy;  Surgeon: Tere Flower MD;  Location:  OR    ESOPHAGOSCOPY, GASTROSCOPY, DUODENOSCOPY (EGD), COMBINED N/A 2023    Procedure: Esophagoscopy, gastroscopy, duodenoscopy (EGD), combined;  Surgeon: Tere Flower MD;  Location:  OR    LAPAROSCOPIC TUBAL LIGATION      23    MAMMOPLASTY REDUCTION      SLING BLADDER SUSPENSION WITH FASCIA SHARA      TOE SURGERY      x2    TONSILLECTOMY      22       Family History:    Family History   Problem Relation Age of Onset    Hypertension Father     Kidney failure Father     Irritable Bowel Syndrome Sister     Hypertension Mother     Heart Disease Mother         Heart Disease, replaced heart valve    Arthritis Mother         Arthritis,Rheumatoid arthritis    Other - See Comments Sister         Acute intermittent porphyria, kidney transplant    Heart Disease Maternal Grandfather         Heart Disease, of diabetes    Breast Cancer Paternal Grandmother         Cancer-breast       Social History:  Marital Status:   [5]  Social History     Tobacco Use    Smoking status: Never    Smokeless tobacco: Never   Vaping Use    Vaping status: Never Used   Substance Use Topics    Alcohol use: Yes     Alcohol/week: 0.8 standard drinks of alcohol     Comment: Alcoholic Drinks/day: occasionally/socially    Drug use: No        Medications:    naproxen (NAPROSYN) 500 MG tablet  triamcinolone (KENALOG) 0.1 % external cream          Review of Systems   Musculoskeletal:         Left ankle pain, knee pain, right shoulder   All other systems reviewed and are negative.  See HPI    Physical Exam   BP: (!) 172/96  Pulse: 78  Temp: 98  F (36.7  C)  Resp: 18  Weight: 90.7 kg (200 lb)  SpO2: 98 %      Physical Exam  Vitals and nursing note reviewed.   Constitutional:       General: She is not in acute distress.     Appearance: She is not ill-appearing or toxic-appearing.   HENT:      Head: Normocephalic.      Nose: Nose normal.      Mouth/Throat:      Mouth: Mucous  membranes are moist.   Eyes:      Extraocular Movements: Extraocular movements intact.      Pupils: Pupils are equal, round, and reactive to light.   Cardiovascular:      Rate and Rhythm: Normal rate and regular rhythm.      Pulses: Normal pulses.           Dorsalis pedis pulses are 2+ on the right side and 2+ on the left side.        Posterior tibial pulses are 2+ on the right side and 2+ on the left side.      Heart sounds: Normal heart sounds.   Pulmonary:      Effort: Pulmonary effort is normal.      Breath sounds: Normal breath sounds.   Abdominal:      Palpations: Abdomen is soft.      Tenderness: There is no abdominal tenderness.   Musculoskeletal:      Right shoulder: Tenderness present. No swelling or deformity. Normal range of motion. Normal strength. Normal pulse.      Left shoulder: Normal.      Right upper arm: Normal.      Left upper arm: Normal.      Right elbow: Normal.      Left elbow: Normal.      Right forearm: Normal.      Left forearm: Normal.      Right wrist: Normal.      Left wrist: Normal.        Arms:       Cervical back: Normal, normal range of motion and neck supple. No tenderness.      Thoracic back: Normal.      Lumbar back: Normal.      Right knee: Swelling present. No deformity or crepitus. Normal range of motion. Tenderness present. Normal alignment.      Left knee: No swelling, deformity or crepitus. Normal range of motion. Tenderness present. Normal alignment.      Right ankle: Normal.      Right Achilles Tendon: Normal.      Left ankle: No deformity. Tenderness present. Decreased range of motion. Normal pulse.      Left Achilles Tendon: Normal.      Right foot: Normal.      Left foot: Normal. Normal capillary refill. No deformity. Normal pulse.        Legs:    Skin:     General: Skin is warm.      Capillary Refill: Capillary refill takes less than 2 seconds.   Neurological:      General: No focal deficit present.      Mental Status: She is alert and oriented to person, place, and  time.   Psychiatric:         Mood and Affect: Mood normal.         Behavior: Behavior normal.         ED Course         Results for orders placed or performed during the hospital encounter of 11/01/24 (from the past 24 hours)   XR Knee Left 3 Views    Narrative    PROCEDURE:  XR KNEE LEFT 3 VIEWS    HISTORY: fall, anterior knee pain    COMPARISON:  None.    TECHNIQUE:  3 views of the left knee were obtained.    FINDINGS:  No fracture or dislocation is identified. Mild degenerative  osteophytes at the medial femoral tibial articulation. The lateral  femoral tibial and the Patellofemoral articulations are normal. There  is no knee effusion      Impression    IMPRESSION: No acute fracture.      WES OTERO MD         SYSTEM ID:  [id number]   XR Ankle Left G/E 3 Views    Narrative    PROCEDURE:  XR ANKLE LEFT G/E 3 VIEWS    HISTORY: fall, pain anterior left ankle    COMPARISON:  None.    TECHNIQUE:  3 views of the left ankle were obtained.    FINDINGS:  No fracture or dislocation is identified. The joint spaces  are preserved.        Impression    IMPRESSION: No acute fracture.      WES OTERO MD         SYSTEM ID:  [id number]   XR Shoulder Right G/E 3 Views    Narrative    PROCEDURE:  XR SHOULDER RIGHT G/E 3 VIEWS    HISTORY: fall, anterior shoulder pain    COMPARISON:  None.    TECHNIQUE:  3 views of the right shoulder were obtained.    FINDINGS:  No fracture or dislocation is identified. The joint spaces  are preserved.        Impression    IMPRESSION: No acute fracture.      WES OTERO MD         SYSTEM ID:  [id number]   XR Knee Right 3 Views    Narrative    PROCEDURE:  XR KNEE RIGHT 3 VIEWS    HISTORY: right knee pain    COMPARISON:  None.    TECHNIQUE:  3 views of the right knee were obtained.    FINDINGS:  No fracture or dislocation is identified. There are mild  degenerative osteophytes at the medial femoral tibial articulation.  The lateral femoral tibial and patellofemoral articulations  appear  normal. There is no knee effusion.      Impression    IMPRESSION: Mild degenerative changes at the medial femoral tibial  articulation      WES OTERO MD         SYSTEM ID:  [id number]   XR Foot Left G/E 3 Views    Narrative    PROCEDURE:  XR FOOT LEFT G/E 3 VIEWS    HISTORY: pain, fall    COMPARISON:  None.    TECHNIQUE:  Riaz views of the left foot were obtained.    FINDINGS:  No fracture or dislocation is identified. The joint spaces  are preserved.        Impression    IMPRESSION: Normal left foot      WES OTERO MD         SYSTEM ID:  [id number]       Medications   acetaminophen (TYLENOL) tablet 975 mg (975 mg Oral $Given 11/1/24 1550)   ibuprofen (ADVIL/MOTRIN) tablet 600 mg (600 mg Oral $Given 11/1/24 1550)       Assessments & Plan (with Medical Decision Making)  Here today after falling a couple feet out of her skid steer after catching her foot complaining of left ankle left knee and right shoulder pain.  She reports that though she hit her head she has no headache and she did not have loss of consciousness.  She hit the top of her head on soft hay. No headache. No neck pain/tenderness.She is alert oriented, non-distressed, plesant.  No neurological deficits. She is vitally stable.   She has no obvious deformities. She is moving all of her extremities. Pain over anterior/lateral left ankle, anterior left knee, right shoulder. Mild swelling over lateral left ankle. Neurovascularly intact.   Labs & Radiology results interpreted by radiologist:   ED Course as of 11/01/24 1848 Fri Nov 01, 2024   1639 XR Knee Left 3 Views  No acute fracture   1639 XR Shoulder Right G/E 3 Views   No acute fracture   1719 XR Ankle Left G/E 3 Views  IMPRESSION: No acute fracture   1826 XR Foot Left G/E 3 Views  IMPRESSION: Normal left foot   1826 XR Knee Right 3 Views   Mild degenerative changes at the medial femoral tibial  articulation         Meds: tylenol/ ibuprofen with relief.   5:23 PM Patient is now  reporting pain in her left foot, right knee and would like imaging. Right knee does appear to have mild swelling.  No acute fractures on xray today. For her left ankle/foot pain, treating for sprain and she is given a supportive boot. Advised tylenol, ibuprofen, ice, elevation, and to follow up in the clinic in 1 week for re-evaluation. She is advised to return if she has new/worsening concerns. Discharged home in stable condition.     I have reviewed the nursing notes.    I have reviewed the findings, diagnosis, plan and need for follow up with the patient.    Medical Decision Making  The patient's presentation was of low complexity (an acute and uncomplicated illness or injury).    The patient's evaluation involved:  ordering and/or review of 3+ test(s) in this encounter (see separate area of note for details)    The patient's management necessitated moderate risk (prescription drug management including medications given in the ED).        New Prescriptions    No medications on file       Final diagnoses:   Fall from slip, trip, or stumble, initial encounter   Acute pain of both knees   Acute left ankle pain   Acute pain of right shoulder   Left foot pain       11/1/2024   [facility] AND [facility] Jillian, APRN CNP  11/01/24 1912

## 2024-11-01 NOTE — ED TRIAGE NOTES
Patient fell out of her heavy equipment that was several feet high. No loc. Did state that her knees cracked. Main complaint is pain in the left ankle. Not on blood thinners unsure if she hit her head.     Triage Assessment (Adult)       Row Name 11/01/24 1509          Triage Assessment    Airway WDL WDL        Respiratory WDL    Respiratory WDL WDL        Skin Circulation/Temperature WDL    Skin Circulation/Temperature WDL WDL        Cardiac WDL    Cardiac WDL WDL        Peripheral/Neurovascular WDL    Peripheral Neurovascular WDL WDL        Cognitive/Neuro/Behavioral WDL    Cognitive/Neuro/Behavioral WDL WDL

## 2024-11-05 ENCOUNTER — TRANSFERRED RECORDS (OUTPATIENT)
Dept: HEALTH INFORMATION MANAGEMENT | Facility: OTHER | Age: 69
End: 2024-11-05
Payer: MEDICARE

## 2024-11-09 DIAGNOSIS — M25.561 CHRONIC PAIN OF RIGHT KNEE: ICD-10-CM

## 2024-11-09 DIAGNOSIS — G89.29 CHRONIC PAIN OF RIGHT KNEE: ICD-10-CM

## 2024-11-09 DIAGNOSIS — M17.11 ARTHRITIS OF RIGHT KNEE: ICD-10-CM

## 2024-11-12 NOTE — TELEPHONE ENCOUNTER
[id number] sent Rx request for the following:      Requested Prescriptions   Pending Prescriptions Disp Refills    naproxen (NAPROSYN) 500 MG tablet [Pharmacy Med Name: Naproxen 500 MG Oral Tablet] 60 tablet 0     Sig: TAKE 1 TABLET BY MOUTH TWICE DAILY WITH MEALS       NSAID Medications Failed - 11/12/2024  4:30 PM        Failed - Most recent blood pressure under 140/90 in past 12 months     BP Readings from Last 3 Encounters:   11/01/24 (!) 172/96   09/09/24 121/76   06/18/24 130/76   No data recorded        Failed - Patient is age 6-64 years        Failed - Always Fail Criteria - Chart Review Required     Last Prescription Date:   9/30/24  Last Fill Qty/Refills:         60, R-0    Arthritis of right knee [M17.11]      Chronic pain of right knee [M25.561, G89.29]        Last Office Visit:              6/18/24 (Physical)   Future Office visit:           None    Unable to complete prescription refill per RN Medication Refill Policy. Teresa Easton RN .............. 11/12/2024  4:35 PM

## 2024-11-13 ENCOUNTER — TRANSFERRED RECORDS (OUTPATIENT)
Dept: HEALTH INFORMATION MANAGEMENT | Facility: OTHER | Age: 69
End: 2024-11-13
Payer: MEDICARE

## 2024-11-13 RX ORDER — NAPROXEN 500 MG/1
TABLET ORAL
Qty: 60 TABLET | Refills: 0 | Status: SHIPPED | OUTPATIENT
Start: 2024-11-13

## 2024-11-26 ENCOUNTER — TRANSFERRED RECORDS (OUTPATIENT)
Dept: HEALTH INFORMATION MANAGEMENT | Facility: OTHER | Age: 69
End: 2024-11-26
Payer: MEDICARE

## 2025-01-28 ENCOUNTER — TRANSFERRED RECORDS (OUTPATIENT)
Dept: HEALTH INFORMATION MANAGEMENT | Facility: OTHER | Age: 70
End: 2025-01-28
Payer: MEDICARE

## 2025-03-03 ENCOUNTER — TRANSFERRED RECORDS (OUTPATIENT)
Dept: HEALTH INFORMATION MANAGEMENT | Facility: OTHER | Age: 70
End: 2025-03-03
Payer: MEDICARE

## 2025-03-18 ENCOUNTER — TRANSFERRED RECORDS (OUTPATIENT)
Dept: HEALTH INFORMATION MANAGEMENT | Facility: OTHER | Age: 70
End: 2025-03-18
Payer: MEDICARE

## 2025-04-21 ENCOUNTER — OFFICE VISIT (OUTPATIENT)
Dept: FAMILY MEDICINE | Facility: OTHER | Age: 70
End: 2025-04-21
Payer: MEDICARE

## 2025-04-21 VITALS
OXYGEN SATURATION: 97 % | DIASTOLIC BLOOD PRESSURE: 70 MMHG | TEMPERATURE: 97.4 F | HEART RATE: 66 BPM | WEIGHT: 207 LBS | RESPIRATION RATE: 18 BRPM | HEIGHT: 64 IN | SYSTOLIC BLOOD PRESSURE: 126 MMHG | BODY MASS INDEX: 35.34 KG/M2

## 2025-04-21 DIAGNOSIS — S30.861A TICK BITE OF ABDOMEN, INITIAL ENCOUNTER: Primary | ICD-10-CM
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
## 2025-04-21 DIAGNOSIS — Z79.2 NEED FOR PROPHYLACTIC ANTIBIOTIC: ICD-10-CM

## 2025-04-21 PROCEDURE — G0463 HOSPITAL OUTPT CLINIC VISIT: HCPCS

## 2025-04-21 RX ORDER — DOXYCYCLINE 100 MG/1
200 CAPSULE ORAL ONCE
Qty: 2 CAPSULE | Refills: 0 | Status: SHIPPED | OUTPATIENT
Start: 2025-04-21 | End: 2025-04-21

## 2025-04-21 RX ORDER — MELOXICAM 15 MG/1
1 TABLET ORAL
COMMUNITY
Start: 2025-03-18

## 2025-04-21 ASSESSMENT — ASTHMA QUESTIONNAIRES
ACT_TOTALSCORE: 25
QUESTION_5 LAST FOUR WEEKS HOW WOULD YOU RATE YOUR ASTHMA CONTROL: COMPLETELY CONTROLLED
QUESTION_1 LAST FOUR WEEKS HOW MUCH OF THE TIME DID YOUR ASTHMA KEEP YOU FROM GETTING AS MUCH DONE AT WORK, SCHOOL OR AT HOME: NONE OF THE TIME
QUESTION_4 LAST FOUR WEEKS HOW OFTEN HAVE YOU USED YOUR RESCUE INHALER OR NEBULIZER MEDICATION (SUCH AS ALBUTEROL): NOT AT ALL
QUESTION_2 LAST FOUR WEEKS HOW OFTEN HAVE YOU HAD SHORTNESS OF BREATH: NOT AT ALL
QUESTION_3 LAST FOUR WEEKS HOW OFTEN DID YOUR ASTHMA SYMPTOMS (WHEEZING, COUGHING, SHORTNESS OF BREATH, CHEST TIGHTNESS OR PAIN) WAKE YOU UP AT NIGHT OR EARLIER THAN USUAL IN THE MORNING: NOT AT ALL

## 2025-04-21 ASSESSMENT — PAIN SCALES - GENERAL: PAINLEVEL_OUTOF10: MODERATE PAIN (4)

## 2025-04-21 NOTE — NURSING NOTE
"Chief Complaint   Patient presents with    Insect Bites     Bit by 2 Ticks        Initial /70 (BP Location: Right arm, Patient Position: Sitting, Cuff Size: Adult Large)   Pulse 66   Temp 97.4  F (36.3  C) (Temporal)   Resp 18   Ht 1.619 m (5' 3.75\")   Wt 93.9 kg (207 lb)   SpO2 97%   BMI 35.81 kg/m   Estimated body mass index is 35.81 kg/m  as calculated from the following:    Height as of this encounter: 1.619 m (5' 3.75\").    Weight as of this encounter: 93.9 kg (207 lb).  Medication Review: complete    The next two questions are to help us understand your food security.  If you are feeling you need any assistance in this area, we have resources available to support you today.          6/18/2024   SDOH- Food Insecurity   Within the past 12 months, did you worry that your food would run out before you got money to buy more? N   Within the past 12 months, did the food you bought just not last and you didn t have money to get more? N         Health Care Directive:  Patient has a Health Care Directive on file      Bisi Pardo LPN      "

## 2025-04-21 NOTE — PROGRESS NOTES
ASSESSMENT/PLAN:    I have reviewed the nursing notes.  I have reviewed the findings, diagnosis, plan and need for follow up with the patient.    1. Tick bite of abdomen, initial encounter (Primary)  2. Need for prophylactic antibiotic    - doxycycline hyclate (VIBRAMYCIN) 100 MG capsule; Take 2 capsules (200 mg) by mouth once for 1 dose.  Dispense: 2 capsule; Refill: 0    - Please read attached information on tick bite along with the Lyme signs and symptoms to monitor for over the next couple of weeks as discussed in the clinic this morning.    - May use over-the-counter Tylenol or ibuprofen as needed for pain for pain, inflammation or fever    - Discussed warning signs/symptoms indicative of need to f/u    - Follow up if symptoms such as general aches, headaches, fatigue, vision changes, joint aches, joint swelling, fevers, bull's-eye rash.    - I explained my diagnostic considerations and recommendations to the patient, who voiced understanding and agreement with the treatment plan. All questions were answered. We discussed potential side effects of any prescribed or recommended therapies, as well as expectations for response to treatments.    FITO Germain CNP  4/21/2025  10:03 AM    HPI:    Debra Heart is a 69 year old female who presents to [facility] today for concerns of bit by 2 ticks, one head still stuck.  Patient states that she found to deer ticks on her abdomen that she pulled off this morning.  Patient states that a piece of the head is still attached/stuck in her skin from the 1 bite.  Patient states that ticks probably not intact for more than 24 to 36 hours as one of them had just a small amount of blood in it.  Patient denies body aches, headaches, fatigue, vision changes, joint aches, joint swelling, fevers, bull's-eye rash and etc.  No at home care treatment.    Past Medical History:   Diagnosis Date    Allergic contact dermatitis due to plants, except food     Recurring poison ivy     Calculus of kidney     Kidney stones (calcium).    Enthesopathy     Right great toe bone spur.    Menstrual migraine without status migrainosus, not intractable     Menstrual migraine    Other intervertebral disc displacement, lumbar region     1999,Herniated disc (lumbar) x's two.     Past Surgical History:   Procedure Laterality Date    1ST MTP FUSION, 2ND HAMMERTOE REPAIR Right 10/29/2013    ARTHROSCOPY KNEE  1996    Bilateral arthroscopy with lateral releases in 1996 and 1997.    COLONOSCOPY  01/01/2010 2010,Due 2020    COLONOSCOPY N/A 7/12/2023    Procedure: Colonoscopy;  Surgeon: Tere Flower MD;  Location:  OR    ESOPHAGOSCOPY, GASTROSCOPY, DUODENOSCOPY (EGD), COMBINED N/A 7/12/2023    Procedure: Esophagoscopy, gastroscopy, duodenoscopy (EGD), combined;  Surgeon: Tere Flower MD;  Location:  OR    LAPAROSCOPIC TUBAL LIGATION      23    MAMMOPLASTY REDUCTION  1993    SLING BLADDER SUSPENSION WITH FASCIA SHARA  2010    TOE SURGERY      x2    TONSILLECTOMY      22     Social History     Tobacco Use    Smoking status: Never    Smokeless tobacco: Never   Substance Use Topics    Alcohol use: Yes     Alcohol/week: 0.8 standard drinks of alcohol     Comment: Alcoholic Drinks/day: occasionally/socially     Current Outpatient Medications   Medication Sig Dispense Refill    doxycycline hyclate (VIBRAMYCIN) 100 MG capsule Take 2 capsules (200 mg) by mouth once for 1 dose. 2 capsule 0    meloxicam (MOBIC) 15 MG tablet Take 1 tablet by mouth daily at 2 pm.      naproxen (NAPROSYN) 500 MG tablet TAKE 1 TABLET BY MOUTH TWICE DAILY WITH MEALS (Patient not taking: Reported on 4/21/2025) 60 tablet 0    triamcinolone (KENALOG) 0.1 % external cream Apply topically 2 times daily. (Patient not taking: Reported on 4/21/2025) 45 g 0     Allergies   Allergen Reactions    Clindamycin Anaphylaxis    Tramadol Rash     Past medical history, past surgical history, current medications and allergies reviewed and accurate to the best  "of my knowledge.      ROS:  Refer to HPI    /70 (BP Location: Right arm, Patient Position: Sitting, Cuff Size: Adult Large)   Pulse 66   Temp 97.4  F (36.3  C) (Temporal)   Resp 18   Ht 1.619 m (5' 3.75\")   Wt 93.9 kg (207 lb)   SpO2 97%   BMI 35.81 kg/m      EXAM:  General Appearance: Well appearing 69 year old female, appropriate appearance for age. No acute distress   Respiratory: normal chest wall and respirations.  Normal effort.  Clear to auscultation bilaterally, no wheezing, crackles or rhonchi.  No increased work of breathing.  No cough appreciated.  Cardiac: RRR with no murmurs    Musculoskeletal:  Equal movement of bilateral upper extremities.  Equal movement of bilateral lower extremities.  Normal gait.    Dermatological: See attached pictures for details of tick bite sites  Neuro: Alert and oriented to person, place, and time.    Psychological: normal affect, alert, oriented, and pleasant.           "

## 2025-04-21 NOTE — PATIENT INSTRUCTIONS
I hope you feel better soon Debra!    Anaplasma symptoms include:  sudden onset fever, chills, body aches, headaches, fatigue, nausea/vomiting, etc  Symptoms usually occur within 5 to 10 days of tick bite.  Follow up immediately if symptoms develop.    Lyme symptoms include:  general aches, headaches, fatigue, vision change, joint aches, joint swelling, fevers, rash (bulls eye lesion can be at site of tick bite, anywhere on the body or multiple lesions), etc    Symptoms usually occur within 2 weeks to 8 weeks.    Prophylactic antibiotic dosing includes Doxycycline if the following criteria is met:    One dose of Doxycycline (200 mg) - do not take with any dairy products within 4 hours    Tick is identified as a deer tick    Tick is assumed to be attached for more than 24 hours    Treatment is started within 72 hours of the tick bite    The proper technique for removal of the attached tick includes the following steps:    If available, use tweezers or small forceps to grasp the tick as close to the skin surface as possible. In the absence of tweezers, use paper or cloth to protect the fingers during tick extraction.  Pull straight up gently but firmly, using steady pressure. Do not jerk or twist.  Do not squeeze, crush, or puncture the body of the tick, since its fluids may contain infectious agents.  Disinfect the skin thoroughly after removing the tick and wash hands with soap and water.  If sections of the mouthparts of the tick remain in the skin, they should be left alone as they will normally be expelled spontaneously.  After the tick removal and the skin cleansing, the person bitten (or the parents) should observe the area for the development of rash for up to 30 days following exposure. Components of tick saliva can cause rash  Since the tick usually needs to be attached for two to three days before transmission of the Lyme disease agent occurs, removal of the tick within this time frame often prevents the  infection

## 2025-07-19 ENCOUNTER — HEALTH MAINTENANCE LETTER (OUTPATIENT)
Age: 70
End: 2025-07-19

## 2025-08-26 ENCOUNTER — OFFICE VISIT (OUTPATIENT)
Dept: FAMILY MEDICINE | Facility: OTHER | Age: 70
End: 2025-08-26
Attending: PHYSICIAN ASSISTANT
Payer: MEDICARE

## 2025-08-26 ENCOUNTER — HOSPITAL ENCOUNTER (OUTPATIENT)
Dept: MAMMOGRAPHY | Facility: OTHER | Age: 70
Discharge: HOME | End: 2025-08-26
Attending: PHYSICIAN ASSISTANT
Payer: MEDICARE

## 2025-08-26 ENCOUNTER — HOSPITAL ENCOUNTER (OUTPATIENT)
Dept: BONE DENSITY | Facility: OTHER | Age: 70
Discharge: HOME | End: 2025-08-26
Attending: PHYSICIAN ASSISTANT
Payer: MEDICARE

## 2025-08-26 VITALS
DIASTOLIC BLOOD PRESSURE: 76 MMHG | OXYGEN SATURATION: 99 % | WEIGHT: 190.4 LBS | BODY MASS INDEX: 33.73 KG/M2 | SYSTOLIC BLOOD PRESSURE: 118 MMHG | HEIGHT: 63 IN | RESPIRATION RATE: 18 BRPM | TEMPERATURE: 97.9 F | HEART RATE: 59 BPM

## 2025-08-26 DIAGNOSIS — Z13.820 SCREENING FOR OSTEOPOROSIS: ICD-10-CM

## 2025-08-26 DIAGNOSIS — Z12.31 VISIT FOR SCREENING MAMMOGRAM: ICD-10-CM

## 2025-08-26 DIAGNOSIS — Z00.00 ENCOUNTER FOR MEDICARE ANNUAL WELLNESS EXAM: Primary | ICD-10-CM

## 2025-08-26 DIAGNOSIS — Z78.0 POST-MENOPAUSAL: ICD-10-CM

## 2025-08-26 DIAGNOSIS — E78.49 OTHER HYPERLIPIDEMIA: ICD-10-CM

## 2025-08-26 DIAGNOSIS — E66.811 CLASS 1 OBESITY WITHOUT SERIOUS COMORBIDITY WITH BODY MASS INDEX (BMI) OF 34.0 TO 34.9 IN ADULT, UNSPECIFIED OBESITY TYPE: ICD-10-CM

## 2025-08-26 LAB
ANION GAP SERPL CALCULATED.3IONS-SCNC: 10 MMOL/L (ref 7–15)
BASOPHILS # BLD AUTO: 0.04 10E3/UL (ref 0–0.2)
BASOPHILS NFR BLD AUTO: 0.9 %
BUN SERPL-MCNC: 12.1 MG/DL (ref 8–23)
CALCIUM SERPL-MCNC: 9.5 MG/DL (ref 8.8–10.4)
CHLORIDE SERPL-SCNC: 106 MMOL/L (ref 98–107)
CHOLEST SERPL-MCNC: 251 MG/DL
CREAT SERPL-MCNC: 0.62 MG/DL (ref 0.51–0.95)
EGFRCR SERPLBLD CKD-EPI 2021: >90 ML/MIN/1.73M2
EOSINOPHIL # BLD AUTO: 0.12 10E3/UL (ref 0–0.7)
EOSINOPHIL NFR BLD AUTO: 2.7 %
ERYTHROCYTE [DISTWIDTH] IN BLOOD BY AUTOMATED COUNT: 12.7 % (ref 10–15)
EST. AVERAGE GLUCOSE BLD GHB EST-MCNC: 128 MG/DL
FASTING STATUS PATIENT QL REPORTED: ABNORMAL
FASTING STATUS PATIENT QL REPORTED: ABNORMAL
GLUCOSE SERPL-MCNC: 103 MG/DL (ref 70–99)
HBA1C MFR BLD: 6.1 %
HCO3 SERPL-SCNC: 25 MMOL/L (ref 22–29)
HCT VFR BLD AUTO: 43.2 % (ref 35–47)
HDLC SERPL-MCNC: 45 MG/DL
HGB BLD-MCNC: 14.3 G/DL (ref 11.7–15.7)
IMM GRANULOCYTES # BLD: <0.03 10E3/UL
IMM GRANULOCYTES NFR BLD: 0.2 %
LDLC SERPL CALC-MCNC: 170 MG/DL
LYMPHOCYTES # BLD AUTO: 1.99 10E3/UL (ref 0.8–5.3)
LYMPHOCYTES NFR BLD AUTO: 44.9 %
MCH RBC QN AUTO: 29.1 PG (ref 26.5–33)
MCHC RBC AUTO-ENTMCNC: 33.1 G/DL (ref 31.5–36.5)
MCV RBC AUTO: 87.8 FL (ref 78–100)
MONOCYTES # BLD AUTO: 0.45 10E3/UL (ref 0–1.3)
MONOCYTES NFR BLD AUTO: 10.2 %
NEUTROPHILS # BLD AUTO: 1.82 10E3/UL (ref 1.6–8.3)
NEUTROPHILS NFR BLD AUTO: 41.1 %
NONHDLC SERPL-MCNC: 206 MG/DL
NRBC # BLD AUTO: <0.03 10E3/UL
NRBC BLD AUTO-RTO: 0 /100
PLATELET # BLD AUTO: 218 10E3/UL (ref 150–450)
POTASSIUM SERPL-SCNC: 4.4 MMOL/L (ref 3.4–5.3)
RBC # BLD AUTO: 4.92 10E6/UL (ref 3.8–5.2)
SODIUM SERPL-SCNC: 141 MMOL/L (ref 135–145)
TRIGL SERPL-MCNC: 181 MG/DL
WBC # BLD AUTO: 4.43 10E3/UL (ref 4–11)

## 2025-08-26 PROCEDURE — 82465 ASSAY BLD/SERUM CHOLESTEROL: CPT | Mod: ZL | Performed by: PHYSICIAN ASSISTANT

## 2025-08-26 PROCEDURE — 85004 AUTOMATED DIFF WBC COUNT: CPT | Mod: ZL | Performed by: PHYSICIAN ASSISTANT

## 2025-08-26 PROCEDURE — 83036 HEMOGLOBIN GLYCOSYLATED A1C: CPT | Mod: ZL | Performed by: PHYSICIAN ASSISTANT

## 2025-08-26 PROCEDURE — G0463 HOSPITAL OUTPT CLINIC VISIT: HCPCS | Mod: 25 | Performed by: PHYSICIAN ASSISTANT

## 2025-08-26 PROCEDURE — 77067 SCR MAMMO BI INCL CAD: CPT

## 2025-08-26 PROCEDURE — 77080 DXA BONE DENSITY AXIAL: CPT

## 2025-08-26 PROCEDURE — 36415 COLL VENOUS BLD VENIPUNCTURE: CPT | Mod: ZL | Performed by: PHYSICIAN ASSISTANT

## 2025-08-26 PROCEDURE — 80048 BASIC METABOLIC PNL TOTAL CA: CPT | Mod: ZL | Performed by: PHYSICIAN ASSISTANT

## 2025-08-26 SDOH — HEALTH STABILITY: PHYSICAL HEALTH: ON AVERAGE, HOW MANY DAYS PER WEEK DO YOU ENGAGE IN MODERATE TO STRENUOUS EXERCISE (LIKE A BRISK WALK)?: 7 DAYS

## (undated) DEVICE — SYR 50ML LL W/O NDL 309653

## (undated) DEVICE — ESU ENDO FORCEP BX HOT FD-210U

## (undated) DEVICE — HEMOCLIP QUICKCLIP PRO OLYMPUS 230CM HX-202UR.B

## (undated) DEVICE — SOL WATER 1500ML

## (undated) DEVICE — ENDO KIT COMPLIANCE DYKENDOCMPLY

## (undated) DEVICE — TUBING SUCTION 10'X3/16" N510

## (undated) DEVICE — SUCTION MANIFOLD NEPTUNE 2 SYS 4 PORT 0702-020-000

## (undated) DEVICE — ENDO BITE BLOCK 60 MAXI LF 00712804

## (undated) DEVICE — ENDO BRUSH CHANNEL MASTER CLEANING 2-4.2MM BW-412T

## (undated) RX ORDER — PROPOFOL 10 MG/ML
INJECTION, EMULSION INTRAVENOUS
Status: DISPENSED
Start: 2023-07-12

## (undated) RX ORDER — IBUPROFEN 400 MG/1
TABLET, FILM COATED ORAL
Status: DISPENSED
Start: 2024-11-01

## (undated) RX ORDER — FENTANYL CITRATE 50 UG/ML
INJECTION, SOLUTION INTRAMUSCULAR; INTRAVENOUS
Status: DISPENSED
Start: 2023-07-12

## (undated) RX ORDER — ONDANSETRON 2 MG/ML
INJECTION INTRAMUSCULAR; INTRAVENOUS
Status: DISPENSED
Start: 2018-10-11

## (undated) RX ORDER — KETOROLAC TROMETHAMINE 15 MG/ML
INJECTION, SOLUTION INTRAMUSCULAR; INTRAVENOUS
Status: DISPENSED
Start: 2018-10-11

## (undated) RX ORDER — LIDOCAINE HYDROCHLORIDE 10 MG/ML
INJECTION, SOLUTION INFILTRATION; PERINEURAL
Status: DISPENSED
Start: 2024-03-07

## (undated) RX ORDER — DEXAMETHASONE SODIUM PHOSPHATE 10 MG/ML
INJECTION, SOLUTION INTRAMUSCULAR; INTRAVENOUS
Status: DISPENSED
Start: 2024-03-07

## (undated) RX ORDER — ACETAMINOPHEN 325 MG/1
TABLET ORAL
Status: DISPENSED
Start: 2024-11-01

## (undated) RX ORDER — SODIUM CHLORIDE, SODIUM LACTATE, POTASSIUM CHLORIDE, CALCIUM CHLORIDE 600; 310; 30; 20 MG/100ML; MG/100ML; MG/100ML; MG/100ML
INJECTION, SOLUTION INTRAVENOUS
Status: DISPENSED
Start: 2018-10-11

## (undated) RX ORDER — IBUPROFEN 200 MG
TABLET ORAL
Status: DISPENSED
Start: 2024-11-01

## (undated) RX ORDER — TETRACAINE HYDROCHLORIDE 5 MG/ML
SOLUTION OPHTHALMIC
Status: DISPENSED
Start: 2024-09-09